# Patient Record
Sex: MALE | Race: WHITE | NOT HISPANIC OR LATINO | Employment: OTHER | URBAN - METROPOLITAN AREA
[De-identification: names, ages, dates, MRNs, and addresses within clinical notes are randomized per-mention and may not be internally consistent; named-entity substitution may affect disease eponyms.]

---

## 2023-03-02 LAB — PROSTATE SPECIFIC AG (NG/ML) IN SER/PLAS: 7.12 NG/ML (ref 0–4)

## 2023-06-04 DIAGNOSIS — I10 BENIGN ESSENTIAL HYPERTENSION: ICD-10-CM

## 2023-06-05 PROBLEM — I10 BENIGN ESSENTIAL HYPERTENSION: Status: ACTIVE | Noted: 2023-06-05

## 2023-06-05 RX ORDER — LISINOPRIL AND HYDROCHLOROTHIAZIDE 12.5; 2 MG/1; MG/1
TABLET ORAL
Qty: 90 TABLET | Refills: 3 | Status: SHIPPED | OUTPATIENT
Start: 2023-06-05 | End: 2024-02-14

## 2023-07-18 ENCOUNTER — LAB (OUTPATIENT)
Dept: LAB | Facility: LAB | Age: 68
End: 2023-07-18
Payer: MEDICARE

## 2023-07-18 ENCOUNTER — OFFICE VISIT (OUTPATIENT)
Dept: PRIMARY CARE | Facility: CLINIC | Age: 68
End: 2023-07-18
Payer: MEDICARE

## 2023-07-18 VITALS
SYSTOLIC BLOOD PRESSURE: 142 MMHG | HEIGHT: 71 IN | DIASTOLIC BLOOD PRESSURE: 72 MMHG | HEART RATE: 67 BPM | BODY MASS INDEX: 30.41 KG/M2 | RESPIRATION RATE: 16 BRPM | WEIGHT: 217.2 LBS | TEMPERATURE: 97.5 F | OXYGEN SATURATION: 98 %

## 2023-07-18 DIAGNOSIS — N42.31 HIGH GRADE PROSTATIC INTRAEPITHELIAL NEOPLASIA: ICD-10-CM

## 2023-07-18 DIAGNOSIS — R97.20 ELEVATED PSA: ICD-10-CM

## 2023-07-18 DIAGNOSIS — I10 BENIGN ESSENTIAL HYPERTENSION: ICD-10-CM

## 2023-07-18 DIAGNOSIS — I10 BENIGN ESSENTIAL HYPERTENSION: Primary | ICD-10-CM

## 2023-07-18 DIAGNOSIS — Z00.00 MEDICARE ANNUAL WELLNESS VISIT, SUBSEQUENT: ICD-10-CM

## 2023-07-18 PROBLEM — K06.9 GINGIVAL AND PERIODONTAL DISEASE: Status: ACTIVE | Noted: 2023-07-18

## 2023-07-18 PROBLEM — D12.6 TUBULAR ADENOMA OF COLON: Status: ACTIVE | Noted: 2023-07-18

## 2023-07-18 PROBLEM — L85.3 DRY SKIN: Status: ACTIVE | Noted: 2023-07-18

## 2023-07-18 PROBLEM — K05.6 GINGIVAL AND PERIODONTAL DISEASE: Status: ACTIVE | Noted: 2023-07-18

## 2023-07-18 PROBLEM — R06.83 SNORING: Status: ACTIVE | Noted: 2023-07-18

## 2023-07-18 PROBLEM — R22.0 FACIAL SWELLING: Status: ACTIVE | Noted: 2023-07-18

## 2023-07-18 PROBLEM — N52.9 ERECTILE DYSFUNCTION: Status: ACTIVE | Noted: 2023-07-18

## 2023-07-18 LAB
BASOPHILS (10*3/UL) IN BLOOD BY AUTOMATED COUNT: 0.02 X10E9/L (ref 0–0.1)
BASOPHILS/100 LEUKOCYTES IN BLOOD BY AUTOMATED COUNT: 0.3 % (ref 0–2)
EOSINOPHILS (10*3/UL) IN BLOOD BY AUTOMATED COUNT: 0.06 X10E9/L (ref 0–0.7)
EOSINOPHILS/100 LEUKOCYTES IN BLOOD BY AUTOMATED COUNT: 0.9 % (ref 0–6)
ERYTHROCYTE DISTRIBUTION WIDTH (RATIO) BY AUTOMATED COUNT: 13.2 % (ref 11.5–14.5)
ERYTHROCYTE MEAN CORPUSCULAR HEMOGLOBIN CONCENTRATION (G/DL) BY AUTOMATED: 33.3 G/DL (ref 32–36)
ERYTHROCYTE MEAN CORPUSCULAR VOLUME (FL) BY AUTOMATED COUNT: 97 FL (ref 80–100)
ERYTHROCYTES (10*6/UL) IN BLOOD BY AUTOMATED COUNT: 4.5 X10E12/L (ref 4.5–5.9)
HEMATOCRIT (%) IN BLOOD BY AUTOMATED COUNT: 43.5 % (ref 41–52)
HEMOGLOBIN (G/DL) IN BLOOD: 14.5 G/DL (ref 13.5–17.5)
IMMATURE GRANULOCYTES/100 LEUKOCYTES IN BLOOD BY AUTOMATED COUNT: 0.3 % (ref 0–0.9)
LEUKOCYTES (10*3/UL) IN BLOOD BY AUTOMATED COUNT: 6.3 X10E9/L (ref 4.4–11.3)
LYMPHOCYTES (10*3/UL) IN BLOOD BY AUTOMATED COUNT: 1.66 X10E9/L (ref 1.2–4.8)
LYMPHOCYTES/100 LEUKOCYTES IN BLOOD BY AUTOMATED COUNT: 26.2 % (ref 13–44)
MONOCYTES (10*3/UL) IN BLOOD BY AUTOMATED COUNT: 0.5 X10E9/L (ref 0.1–1)
MONOCYTES/100 LEUKOCYTES IN BLOOD BY AUTOMATED COUNT: 7.9 % (ref 2–10)
NEUTROPHILS (10*3/UL) IN BLOOD BY AUTOMATED COUNT: 4.07 X10E9/L (ref 1.2–7.7)
NEUTROPHILS/100 LEUKOCYTES IN BLOOD BY AUTOMATED COUNT: 64.4 % (ref 40–80)
NRBC (PER 100 WBCS) BY AUTOMATED COUNT: 0 /100 WBC (ref 0–0)
PLATELETS (10*3/UL) IN BLOOD AUTOMATED COUNT: 210 X10E9/L (ref 150–450)

## 2023-07-18 PROCEDURE — G0444 DEPRESSION SCREEN ANNUAL: HCPCS | Performed by: FAMILY MEDICINE

## 2023-07-18 PROCEDURE — 1036F TOBACCO NON-USER: CPT | Performed by: FAMILY MEDICINE

## 2023-07-18 PROCEDURE — 36415 COLL VENOUS BLD VENIPUNCTURE: CPT

## 2023-07-18 PROCEDURE — 84153 ASSAY OF PSA TOTAL: CPT

## 2023-07-18 PROCEDURE — 3008F BODY MASS INDEX DOCD: CPT | Performed by: FAMILY MEDICINE

## 2023-07-18 PROCEDURE — 85025 COMPLETE CBC W/AUTO DIFF WBC: CPT

## 2023-07-18 PROCEDURE — G0439 PPPS, SUBSEQ VISIT: HCPCS | Performed by: FAMILY MEDICINE

## 2023-07-18 PROCEDURE — 80053 COMPREHEN METABOLIC PANEL: CPT

## 2023-07-18 PROCEDURE — 1170F FXNL STATUS ASSESSED: CPT | Performed by: FAMILY MEDICINE

## 2023-07-18 PROCEDURE — 3077F SYST BP >= 140 MM HG: CPT | Performed by: FAMILY MEDICINE

## 2023-07-18 PROCEDURE — 84443 ASSAY THYROID STIM HORMONE: CPT

## 2023-07-18 PROCEDURE — 1126F AMNT PAIN NOTED NONE PRSNT: CPT | Performed by: FAMILY MEDICINE

## 2023-07-18 PROCEDURE — 3078F DIAST BP <80 MM HG: CPT | Performed by: FAMILY MEDICINE

## 2023-07-18 PROCEDURE — 1159F MED LIST DOCD IN RCRD: CPT | Performed by: FAMILY MEDICINE

## 2023-07-18 PROCEDURE — 1160F RVW MEDS BY RX/DR IN RCRD: CPT | Performed by: FAMILY MEDICINE

## 2023-07-18 PROCEDURE — 81003 URINALYSIS AUTO W/O SCOPE: CPT

## 2023-07-18 PROCEDURE — 80061 LIPID PANEL: CPT

## 2023-07-18 RX ORDER — TADALAFIL 20 MG/1
TABLET ORAL
COMMUNITY
End: 2024-01-18 | Stop reason: SDUPTHER

## 2023-07-18 ASSESSMENT — ACTIVITIES OF DAILY LIVING (ADL)
GROCERY_SHOPPING: INDEPENDENT
DRESSING: INDEPENDENT
TAKING_MEDICATION: INDEPENDENT
MANAGING_FINANCES: INDEPENDENT
BATHING: INDEPENDENT
DOING_HOUSEWORK: INDEPENDENT

## 2023-07-18 ASSESSMENT — PATIENT HEALTH QUESTIONNAIRE - PHQ9
1. LITTLE INTEREST OR PLEASURE IN DOING THINGS: NOT AT ALL
SUM OF ALL RESPONSES TO PHQ9 QUESTIONS 1 AND 2: 0
2. FEELING DOWN, DEPRESSED OR HOPELESS: NOT AT ALL

## 2023-07-18 ASSESSMENT — PAIN SCALES - GENERAL: PAINLEVEL: 0-NO PAIN

## 2023-07-18 NOTE — PROGRESS NOTES
"No concerns to address    Subjective   Reason for Visit: Josemanuel Gorman is an 67 y.o. male here for a Medicare Wellness visit.          Reviewed all medications by prescribing practitioner or clinical pharmacist (such as prescriptions, OTCs, herbal therapies and supplements) and documented in the medical record.    HPI    Patient Care Team:  Dima Whiting MD as PCP - General (Family Medicine)  Dima Whitnig MD as PCP - [employer] PCP     Review of Systems    Objective   Vitals:  /72 (BP Location: Right arm, Patient Position: Sitting, BP Cuff Size: Adult)   Pulse 67   Temp 36.4 °C (97.5 °F) (Temporal)   Resp 16   Ht 1.791 m (5' 10.5\")   Wt 98.5 kg (217 lb 3.2 oz)   SpO2 98%   BMI 30.72 kg/m²       Physical Exam    Assessment/Plan   Problem List Items Addressed This Visit    None         "

## 2023-07-18 NOTE — PROGRESS NOTES
"Subjective   Patient ID: Josemanuel Gorman is a 67 y.o. male who presents for Medicare Annual Wellness Visit Subsequent.    [facility]   Josemanuel was seen today for a routine follow-up of his hypertension, and an annual Medicare wellness review.  He overall feels well, has no new concerns.  His brother, who is younger by 16 months, had a massive heart attack not long ago.  Josemanuel states that he had uncontrolled hypertension for years, essentially never sought medical care.  Josemanuel has never smoked, had high blood sugars, and has no chest pain, dyspnea, lower extremity edema.    He is fasting for blood today.  Urology has followed his PSA levels closely.  He had multiple biopsies March 29 of this year, virtually all showed benign tissue with inflammation, several with high-grade neoplasia.  He has a follow-up soon in early August.    He just had his first Shingrix yesterday, colonoscopy is up-to-date, due next year.  He has no indication for AAA ultrasound.  Pneumonia vaccines are up-to-date.  Review of Systems  The full, 10+ multi-organ review of systems, is within normal limits with the exception of what is noted above in HPI.  Objective   /72 (BP Location: Right arm, Patient Position: Sitting, BP Cuff Size: Adult)   Pulse 67   Temp 36.4 °C (97.5 °F) (Temporal)   Resp 16   Ht 1.791 m (5' 10.5\")   Wt 98.5 kg (217 lb 3.2 oz)   SpO2 98%   BMI 30.72 kg/m²     Physical Exam  Cardiac exam reveals a regular rate and rhythm, lungs are clear, no lower extremity edema present  Constitutional/General appearance: alert, oriented, well-appearing, in no distress  Head and face exam is normal  No scleral icterus or conjunctival erythema present  Hearing is definitely reduced, hearing aids in place  Respiratory effort is normal, no dyspnea noted  Cortical function is normal  Mood, affect, are pleasant, appropriate, and interactive.  Insight is normal    Assessment/Plan     Hypertension, slightly elevated at 142 systolic.  He will " continue to work on weight loss efforts, has lost 5 pounds recently.  I have ordered a CT coronary artery scan, if sufficient plaque present, would recommend statin, aspirin, tight blood pressure control.    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    All Medicare gaps closed    Follow-up in 6 months    **Portions of this medical record have been created using voice recognition software and may have minor errors which are inherent in voice recognition systems. It has not been fully edited for typographical or grammatical errors**

## 2023-07-19 LAB
ALANINE AMINOTRANSFERASE (SGPT) (U/L) IN SER/PLAS: 19 U/L (ref 10–52)
ALBUMIN (G/DL) IN SER/PLAS: 4.7 G/DL (ref 3.4–5)
ALKALINE PHOSPHATASE (U/L) IN SER/PLAS: 96 U/L (ref 33–136)
ANION GAP IN SER/PLAS: 15 MMOL/L (ref 10–20)
APPEARANCE, URINE: CLEAR
ASPARTATE AMINOTRANSFERASE (SGOT) (U/L) IN SER/PLAS: 15 U/L (ref 9–39)
BILIRUBIN TOTAL (MG/DL) IN SER/PLAS: 1 MG/DL (ref 0–1.2)
BILIRUBIN, URINE: NEGATIVE
BLOOD, URINE: NEGATIVE
CALCIUM (MG/DL) IN SER/PLAS: 9.2 MG/DL (ref 8.6–10.6)
CARBON DIOXIDE, TOTAL (MMOL/L) IN SER/PLAS: 26 MMOL/L (ref 21–32)
CHLORIDE (MMOL/L) IN SER/PLAS: 102 MMOL/L (ref 98–107)
CHOLESTEROL (MG/DL) IN SER/PLAS: 181 MG/DL (ref 0–199)
CHOLESTEROL IN HDL (MG/DL) IN SER/PLAS: 39.6 MG/DL
CHOLESTEROL/HDL RATIO: 4.6
COLOR, URINE: YELLOW
CREATININE (MG/DL) IN SER/PLAS: 0.66 MG/DL (ref 0.5–1.3)
GFR MALE: >90 ML/MIN/1.73M2
GLUCOSE (MG/DL) IN SER/PLAS: 86 MG/DL (ref 74–99)
GLUCOSE, URINE: NEGATIVE MG/DL
KETONES, URINE: NEGATIVE MG/DL
LDL: 124 MG/DL (ref 0–99)
LEUKOCYTE ESTERASE, URINE: NEGATIVE
NITRITE, URINE: NEGATIVE
PH, URINE: 5 (ref 5–8)
POTASSIUM (MMOL/L) IN SER/PLAS: 4.3 MMOL/L (ref 3.5–5.3)
PROSTATE SPECIFIC AG (NG/ML) IN SER/PLAS: 6.52 NG/ML (ref 0–4)
PROTEIN TOTAL: 7.1 G/DL (ref 6.4–8.2)
PROTEIN, URINE: NEGATIVE MG/DL
SODIUM (MMOL/L) IN SER/PLAS: 139 MMOL/L (ref 136–145)
SPECIFIC GRAVITY, URINE: 1.02 (ref 1–1.03)
THYROTROPIN (MIU/L) IN SER/PLAS BY DETECTION LIMIT <= 0.05 MIU/L: 1.29 MIU/L (ref 0.44–3.98)
TRIGLYCERIDE (MG/DL) IN SER/PLAS: 85 MG/DL (ref 0–149)
UREA NITROGEN (MG/DL) IN SER/PLAS: 19 MG/DL (ref 6–23)
UROBILINOGEN, URINE: <2 MG/DL (ref 0–1.9)
VLDL: 17 MG/DL (ref 0–40)

## 2023-07-19 NOTE — RESULT ENCOUNTER NOTE
PSA is slightly lower than last check, was 7.12, now 6.5 to.  Continue follow-up with urology.  Cholesterol, sugar, all other labs are reassuring.

## 2023-10-30 ENCOUNTER — LAB (OUTPATIENT)
Dept: LAB | Facility: LAB | Age: 68
End: 2023-10-30
Payer: MEDICARE

## 2023-10-30 DIAGNOSIS — R97.20 ELEVATED PROSTATE SPECIFIC ANTIGEN (PSA): Primary | ICD-10-CM

## 2023-10-30 PROCEDURE — 36415 COLL VENOUS BLD VENIPUNCTURE: CPT

## 2023-10-30 PROCEDURE — 84153 ASSAY OF PSA TOTAL: CPT

## 2023-10-31 LAB — PSA SERPL-MCNC: 7.14 NG/ML

## 2023-11-06 ENCOUNTER — OFFICE VISIT (OUTPATIENT)
Dept: UROLOGY | Facility: HOSPITAL | Age: 68
End: 2023-11-06
Payer: MEDICARE

## 2023-11-06 DIAGNOSIS — R97.20 ELEVATED PSA: ICD-10-CM

## 2023-11-06 PROCEDURE — 3078F DIAST BP <80 MM HG: CPT | Performed by: STUDENT IN AN ORGANIZED HEALTH CARE EDUCATION/TRAINING PROGRAM

## 2023-11-06 PROCEDURE — 3008F BODY MASS INDEX DOCD: CPT | Performed by: STUDENT IN AN ORGANIZED HEALTH CARE EDUCATION/TRAINING PROGRAM

## 2023-11-06 PROCEDURE — 1126F AMNT PAIN NOTED NONE PRSNT: CPT | Performed by: STUDENT IN AN ORGANIZED HEALTH CARE EDUCATION/TRAINING PROGRAM

## 2023-11-06 PROCEDURE — 99213 OFFICE O/P EST LOW 20 MIN: CPT | Performed by: STUDENT IN AN ORGANIZED HEALTH CARE EDUCATION/TRAINING PROGRAM

## 2023-11-06 PROCEDURE — 3077F SYST BP >= 140 MM HG: CPT | Performed by: STUDENT IN AN ORGANIZED HEALTH CARE EDUCATION/TRAINING PROGRAM

## 2023-11-06 PROCEDURE — 1160F RVW MEDS BY RX/DR IN RCRD: CPT | Performed by: STUDENT IN AN ORGANIZED HEALTH CARE EDUCATION/TRAINING PROGRAM

## 2023-11-06 PROCEDURE — 1036F TOBACCO NON-USER: CPT | Performed by: STUDENT IN AN ORGANIZED HEALTH CARE EDUCATION/TRAINING PROGRAM

## 2023-11-06 PROCEDURE — 1159F MED LIST DOCD IN RCRD: CPT | Performed by: STUDENT IN AN ORGANIZED HEALTH CARE EDUCATION/TRAINING PROGRAM

## 2023-11-06 NOTE — PROGRESS NOTES
UROLOGIC FOLLOW-UP VISIT     PROBLEM LIST:  1. Elevated PSA  PSA           HISTORY OF PRESENT ILLNESS:   67-year-old male with a history of elevated PSA who subsequently underwent a prostate MRI which showed a PI-RADS 5 lesion. Patient previously underwent a prostate MRI in 2021 which was negative for any clinically significant lesions. Patient underwent fusion biopsy on 3/22/23, and presents today for PSA follow up. His most recent PSA on 10/30/2023 was 7.14. Denies any hematuria, f/c/n/v.     PAST MEDICAL HISTORY:  Past Medical History:   Diagnosis Date    Encounter for general adult medical examination without abnormal findings 06/24/2021    Encounter for Medicare annual wellness exam    Encounter for general adult medical examination without abnormal findings 04/10/2019    Welcome to Medicare preventive visit    Encounter for immunization     Immunization due    Encounter for screening for malignant neoplasm of colon 04/03/2018    Colon cancer screening    GERD (gastroesophageal reflux disease) 6/2018    HL (hearing loss) 6/2008    Hypertension 6/2006    Personal history of other drug therapy 04/10/2019    History of pneumococcal vaccination    Personal history of other drug therapy 06/24/2021    History of drug therapy       PAST SURGICAL HISTORY:  Past Surgical History:   Procedure Laterality Date    COLONOSCOPY W/ POLYPECTOMY  09/22/2014    Complete Colonoscopy For Polyp Removal    OTHER SURGICAL HISTORY  03/26/2014    Lipectomy Of Arm        ALLERGIES:   No Known Allergies     MEDICATIONS:     Current Outpatient Medications:     lisinopriL-hydrochlorothiazide 20-12.5 mg tablet, TAKE 1 TABLET BY MOUTH DAILY, Disp: 90 tablet, Rfl: 3    tadalafil 20 mg tablet, TAKE ONE TABLET BY MOUTH DAILY 1 HOUR BEFORE NEEDED, Disp: , Rfl:       SOCIAL HISTORY:  Patient  reports that he has never smoked. He has never used smokeless tobacco. He reports current alcohol use of about 2.0 standard drinks of alcohol per week. He  reports that he does not use drugs.   Social History     Socioeconomic History    Marital status: Single     Spouse name: Not on file    Number of children: Not on file    Years of education: Not on file    Highest education level: Not on file   Occupational History    Not on file   Tobacco Use    Smoking status: Never    Smokeless tobacco: Never   Vaping Use    Vaping Use: Never used   Substance and Sexual Activity    Alcohol use: Yes     Alcohol/week: 2.0 standard drinks of alcohol     Types: 2 Cans of beer per week    Drug use: Never    Sexual activity: Yes     Partners: Female   Other Topics Concern    Not on file   Social History Narrative    Not on file     Social Determinants of Health     Financial Resource Strain: Not on file   Food Insecurity: Not on file   Transportation Needs: Not on file   Physical Activity: Not on file   Stress: Not on file   Social Connections: Not on file   Intimate Partner Violence: Not on file   Housing Stability: Not on file       FAMILY HISTORY:  Family History   Problem Relation Name Age of Onset    Hypertension Father      Multiple sclerosis Sister      Hypertension Brother Rick        REVIEW OF SYSTEMS:  Constitutional: Negative for fever and chills. Denies anorexia, weight loss.  Eyes: Negative for visual disturbance.   Respiratory: Negative for shortness of breath.    Cardiovascular: Negative for chest pain.   Gastrointestinal: Negative for nausea and vomiting.   Genitourinary: See interval history above.  Skin: Negative for rash.   Neurological: Negative for dizziness and numbness.   Psychiatric/Behavioral: Negative for confusion and decreased concentration.     PHYSICAL EXAM:  There were no vitals taken for this visit.  Constitutional: Patient appears well-developed and well-nourished. No distress.    Head: Normocephalic and atraumatic.    Neck: Normal range of motion.    Cardiovascular: Normal rate.    Pulmonary/Chest: Effort normal. No respiratory distress.    Abdominal: soft NTND  Musculoskeletal: Normal range of motion.    Neurological: Alert and oriented to person, place, and time.  Psychiatric: Normal mood and affect. Behavior is normal. Thought content normal.      Lab Results   Component Value Date    BUN 19 07/18/2023    CREATININE 0.66 07/18/2023     07/18/2023    K 4.3 07/18/2023     07/18/2023    CO2 26 07/18/2023    CALCIUM 9.2 07/18/2023      Lab Results   Component Value Date    WBC 6.3 07/18/2023    RBC 4.50 07/18/2023    HGB 14.5 07/18/2023    HCT 43.5 07/18/2023    MCV 97 07/18/2023    MCHC 33.3 07/18/2023    RDW 13.2 07/18/2023     07/18/2023        Lab Results   Component Value Date    PSA 7.14 (H) 10/30/2023    PSA 6.52 (H) 07/18/2023    PSA 7.12 (H) 03/01/2023    PSA 7.87 (H) 01/05/2023    PSA 8.0 (H) 10/03/2022    PSA 7.6 (H) 06/21/2022    PSA 8.5 (H) 02/15/2022    PSA 8.8 (H) 07/02/2021           Assessment:      1. Elevated PSA  PSA           Plan:    Reviewed and interpreted PSA results with patient    Counseled patient on needing to keep a close eye on his PSA numbers    Rtc in 3 mo with PSA     28 minutes total spent on patient's care today; >50% time spent on counseling/coordination of care

## 2024-01-17 ASSESSMENT — ENCOUNTER SYMPTOMS
HYPERTENSION: 1
PND: 0
NECK PAIN: 0
BLURRED VISION: 0
ORTHOPNEA: 0
SWEATS: 0
SHORTNESS OF BREATH: 0
PALPITATIONS: 0
HEADACHES: 0

## 2024-01-18 ENCOUNTER — OFFICE VISIT (OUTPATIENT)
Dept: PRIMARY CARE | Facility: CLINIC | Age: 69
End: 2024-01-18
Payer: MEDICARE

## 2024-01-18 VITALS
WEIGHT: 217.4 LBS | HEART RATE: 54 BPM | RESPIRATION RATE: 14 BRPM | OXYGEN SATURATION: 99 % | TEMPERATURE: 96 F | DIASTOLIC BLOOD PRESSURE: 67 MMHG | SYSTOLIC BLOOD PRESSURE: 124 MMHG | BODY MASS INDEX: 30.32 KG/M2

## 2024-01-18 DIAGNOSIS — N52.9 ERECTILE DYSFUNCTION, UNSPECIFIED ERECTILE DYSFUNCTION TYPE: ICD-10-CM

## 2024-01-18 DIAGNOSIS — I10 BENIGN ESSENTIAL HYPERTENSION: Primary | ICD-10-CM

## 2024-01-18 DIAGNOSIS — N42.31 HIGH GRADE PROSTATIC INTRAEPITHELIAL NEOPLASIA: ICD-10-CM

## 2024-01-18 PROCEDURE — 1126F AMNT PAIN NOTED NONE PRSNT: CPT | Performed by: FAMILY MEDICINE

## 2024-01-18 PROCEDURE — 3074F SYST BP LT 130 MM HG: CPT | Performed by: FAMILY MEDICINE

## 2024-01-18 PROCEDURE — 1036F TOBACCO NON-USER: CPT | Performed by: FAMILY MEDICINE

## 2024-01-18 PROCEDURE — 1159F MED LIST DOCD IN RCRD: CPT | Performed by: FAMILY MEDICINE

## 2024-01-18 PROCEDURE — 3078F DIAST BP <80 MM HG: CPT | Performed by: FAMILY MEDICINE

## 2024-01-18 PROCEDURE — 3008F BODY MASS INDEX DOCD: CPT | Performed by: FAMILY MEDICINE

## 2024-01-18 PROCEDURE — 99214 OFFICE O/P EST MOD 30 MIN: CPT | Performed by: FAMILY MEDICINE

## 2024-01-18 PROCEDURE — 1160F RVW MEDS BY RX/DR IN RCRD: CPT | Performed by: FAMILY MEDICINE

## 2024-01-18 RX ORDER — TADALAFIL 20 MG/1
TABLET ORAL
Qty: 30 TABLET | Refills: 1 | Status: SHIPPED | OUTPATIENT
Start: 2024-01-18

## 2024-01-18 NOTE — PROGRESS NOTES
Subjective   Patient ID: Josemanuel Gorman is a 68 y.o. male who presents for Follow-up (Pt presents for 6 month follow up HTN- pt states no new concerns at this time. ).    HPI   Josemanuel was seen today for a 6-month follow-up of his hypertension, for which he takes lisinopril/hydrochlorothiazide.  He tolerates it well, has an occasional cough which is not bothersome.  His only other medication is tadalafil, which he takes as needed, 1/2 tablet.  He does request a refill.  There are no complaints of chest pain, shortness of breath, lower extremity edema, or exertional concerns  Fasting labs and urine studies were checked in July.  PSA is elevated, he sees urology in another month or so.  He is up-to-date with all respiratory vaccines.  Review of Systems  The full, 10+ multi-organ review of systems, is within normal limits with the exception of what is noted above in HPI.  Objective   /67 (BP Location: Left arm, Patient Position: Sitting, BP Cuff Size: Small adult)   Pulse 54   Temp 35.6 °C (96 °F) (Temporal)   Resp 14   Wt 98.6 kg (217 lb 6.4 oz)   SpO2 99%   BMI 30.32 kg/m²     Physical Exam  Constitutional/General appearance: alert, oriented, well-appearing, in no distress  Head and face exam is normal  No scleral icterus or conjunctival erythema present  Hearing is significantly reduced, aids in place  Respiratory effort is normal, no dyspnea noted  Cortical function is normal  Mood, affect, are pleasant, appropriate, and interactive.  Insight is normal  Cardiac exam reveals a regular rate and rhythm, lungs are clear, no lower extremity edema present.  Assessment/Plan     Hypertension--- since today's blood pressures are at goal, I have recommended continuing the current treatment regimen, including medication as noted above, as well as a low salt, low-fat, high-fiber diet.  Exercise is to be continued as able and tolerated.  We will continue to follow the high blood pressure on an every six-month basis,  and address additional needs should they arise.    Erectile dysfunction, tadalafil refilled as noted.  Vaccines up-to-date    Continue healthy lifestyle efforts  Follow-up in 6 months for annual Medicare wellness.

## 2024-01-29 ENCOUNTER — LAB (OUTPATIENT)
Dept: LAB | Facility: LAB | Age: 69
End: 2024-01-29
Payer: MEDICARE

## 2024-01-29 DIAGNOSIS — R97.20 ELEVATED PSA: ICD-10-CM

## 2024-01-29 PROCEDURE — 84153 ASSAY OF PSA TOTAL: CPT

## 2024-01-29 PROCEDURE — 36415 COLL VENOUS BLD VENIPUNCTURE: CPT

## 2024-01-30 LAB — PSA SERPL-MCNC: 6.42 NG/ML

## 2024-02-02 NOTE — PROGRESS NOTES
UROLOGIC FOLLOW-UP VISIT     PROBLEM LIST:  1. Elevated PSA  PSA           HISTORY OF PRESENT ILLNESS:     67-year-old male with a history of elevated PSA who subsequently underwent a prostate MRI which showed a PI-RADS 5 lesion. Patient previously underwent a prostate MRI in 2021 which was negative for any clinically significant lesions. Patient underwent fusion biopsy on 3/22/23, which was negative, and presents today for PSA follow up. PSA 10/30/2023 7.14, 1/29/2024 6.42 . Denies any hematuria, f/c/n/v.      PAST MEDICAL HISTORY:  Past Medical History:   Diagnosis Date    Encounter for general adult medical examination without abnormal findings 06/24/2021    Encounter for Medicare annual wellness exam    Encounter for general adult medical examination without abnormal findings 04/10/2019    Welcome to Medicare preventive visit    Encounter for immunization     Immunization due    Encounter for screening for malignant neoplasm of colon 04/03/2018    Colon cancer screening    GERD (gastroesophageal reflux disease) 6/2018    HL (hearing loss) 6/2008    Hypertension 6/2006    Personal history of other drug therapy 04/10/2019    History of pneumococcal vaccination    Personal history of other drug therapy 06/24/2021    History of drug therapy       PAST SURGICAL HISTORY:  Past Surgical History:   Procedure Laterality Date    COLONOSCOPY W/ POLYPECTOMY  09/22/2014    Complete Colonoscopy For Polyp Removal    OTHER SURGICAL HISTORY  03/26/2014    Lipectomy Of Arm        ALLERGIES:   No Known Allergies     MEDICATIONS:     Current Outpatient Medications:     lisinopriL-hydrochlorothiazide 20-12.5 mg tablet, TAKE 1 TABLET BY MOUTH DAILY, Disp: 90 tablet, Rfl: 3    tadalafil 20 mg tablet, TAKE ONE TABLET BY MOUTH DAILY 1 HOUR BEFORE NEEDED.  GoodRx please, Disp: 30 tablet, Rfl: 1      SOCIAL HISTORY:  Patient  reports that he has never smoked. He has never used smokeless tobacco. He reports current alcohol use of about  2.0 standard drinks of alcohol per week. He reports that he does not use drugs.   Social History     Socioeconomic History    Marital status: Single     Spouse name: Not on file    Number of children: Not on file    Years of education: Not on file    Highest education level: Not on file   Occupational History    Not on file   Tobacco Use    Smoking status: Never    Smokeless tobacco: Never   Vaping Use    Vaping Use: Never used   Substance and Sexual Activity    Alcohol use: Yes     Alcohol/week: 2.0 standard drinks of alcohol     Types: 2 Cans of beer per week    Drug use: Never    Sexual activity: Yes     Partners: Female     Birth control/protection: None   Other Topics Concern    Not on file   Social History Narrative    Not on file     Social Determinants of Health     Financial Resource Strain: Not on file   Food Insecurity: Not on file   Transportation Needs: Not on file   Physical Activity: Not on file   Stress: Not on file   Social Connections: Not on file   Intimate Partner Violence: Not on file   Housing Stability: Not on file       FAMILY HISTORY:  Family History   Problem Relation Name Age of Onset    Hypertension Father JR     Multiple sclerosis Sister      Hypertension Brother Rick        REVIEW OF SYSTEMS:  Constitutional: Negative for fever and chills. Denies anorexia, weight loss.  Eyes: Negative for visual disturbance.   Respiratory: Negative for shortness of breath.    Cardiovascular: Negative for chest pain.   Gastrointestinal: Negative for nausea and vomiting.   Genitourinary: See interval history above.  Skin: Negative for rash.   Neurological: Negative for dizziness and numbness.   Psychiatric/Behavioral: Negative for confusion and decreased concentration.     PHYSICAL EXAM:  There were no vitals taken for this visit.  Constitutional: Patient appears well-developed and well-nourished. No distress.    Head: Normocephalic and atraumatic.    Neck: Normal range of motion.    Cardiovascular:  Normal rate.    Pulmonary/Chest: Effort normal. No respiratory distress.   Abdominal: soft NTND  Musculoskeletal: Normal range of motion.    Neurological: Alert and oriented to person, place, and time.  Psychiatric: Normal mood and affect. Behavior is normal. Thought content normal.      \LABORATORY REVIEW:     Lab Results   Component Value Date    BUN 19 07/18/2023    CREATININE 0.66 07/18/2023     07/18/2023    K 4.3 07/18/2023     07/18/2023    CO2 26 07/18/2023    CALCIUM 9.2 07/18/2023      Lab Results   Component Value Date    WBC 6.3 07/18/2023    RBC 4.50 07/18/2023    HGB 14.5 07/18/2023    HCT 43.5 07/18/2023    MCV 97 07/18/2023    MCHC 33.3 07/18/2023    RDW 13.2 07/18/2023     07/18/2023        Lab Results   Component Value Date    PSA 6.42 (H) 01/29/2024    PSA 7.14 (H) 10/30/2023    PSA 6.52 (H) 07/18/2023    PSA 7.12 (H) 03/01/2023    PSA 7.87 (H) 01/05/2023    PSA 8.0 (H) 10/03/2022    PSA 7.6 (H) 06/21/2022    PSA 8.5 (H) 02/15/2022    PSA 8.8 (H) 07/02/2021        Assessment:      1. Elevated PSA  PSA          Plan:    Reviewed and interpreted patient's PSA trend with him today    Counseled patient that I would like to continue obtaining his PSA q3mo    Will likely repeat MRI after next appt.   RTC in 3 mo    24 minutes total spent on patient's care today; >50% time spent on counseling/coordination of care

## 2024-02-05 ENCOUNTER — OFFICE VISIT (OUTPATIENT)
Dept: UROLOGY | Facility: HOSPITAL | Age: 69
End: 2024-02-05
Payer: MEDICARE

## 2024-02-05 DIAGNOSIS — R97.20 ELEVATED PSA: Primary | ICD-10-CM

## 2024-02-05 PROCEDURE — 3008F BODY MASS INDEX DOCD: CPT | Performed by: STUDENT IN AN ORGANIZED HEALTH CARE EDUCATION/TRAINING PROGRAM

## 2024-02-05 PROCEDURE — 1159F MED LIST DOCD IN RCRD: CPT | Performed by: STUDENT IN AN ORGANIZED HEALTH CARE EDUCATION/TRAINING PROGRAM

## 2024-02-05 PROCEDURE — 99213 OFFICE O/P EST LOW 20 MIN: CPT | Performed by: STUDENT IN AN ORGANIZED HEALTH CARE EDUCATION/TRAINING PROGRAM

## 2024-02-05 PROCEDURE — 1126F AMNT PAIN NOTED NONE PRSNT: CPT | Performed by: STUDENT IN AN ORGANIZED HEALTH CARE EDUCATION/TRAINING PROGRAM

## 2024-02-05 PROCEDURE — 1036F TOBACCO NON-USER: CPT | Performed by: STUDENT IN AN ORGANIZED HEALTH CARE EDUCATION/TRAINING PROGRAM

## 2024-02-13 DIAGNOSIS — I10 BENIGN ESSENTIAL HYPERTENSION: ICD-10-CM

## 2024-02-14 RX ORDER — LISINOPRIL AND HYDROCHLOROTHIAZIDE 12.5; 2 MG/1; MG/1
TABLET ORAL
Qty: 100 TABLET | Refills: 3 | Status: SHIPPED | OUTPATIENT
Start: 2024-02-14

## 2024-04-01 ENCOUNTER — HOSPITAL ENCOUNTER (OUTPATIENT)
Dept: RADIOLOGY | Facility: CLINIC | Age: 69
Discharge: HOME | End: 2024-04-01
Payer: MEDICARE

## 2024-04-01 DIAGNOSIS — I10 BENIGN ESSENTIAL HYPERTENSION: ICD-10-CM

## 2024-04-01 PROCEDURE — 75571 CT HRT W/O DYE W/CA TEST: CPT

## 2024-05-14 ENCOUNTER — LAB (OUTPATIENT)
Dept: LAB | Facility: LAB | Age: 69
End: 2024-05-14
Payer: MEDICARE

## 2024-05-14 DIAGNOSIS — R97.20 ELEVATED PSA: Primary | ICD-10-CM

## 2024-05-15 ENCOUNTER — LAB (OUTPATIENT)
Dept: LAB | Facility: LAB | Age: 69
End: 2024-05-15
Payer: MEDICARE

## 2024-05-15 DIAGNOSIS — R97.20 ELEVATED PSA: ICD-10-CM

## 2024-05-15 PROCEDURE — 36415 COLL VENOUS BLD VENIPUNCTURE: CPT

## 2024-05-15 PROCEDURE — 84153 ASSAY OF PSA TOTAL: CPT

## 2024-05-15 NOTE — PROGRESS NOTES
UROLOGIC FOLLOW-UP VISIT     PROBLEM LIST:  1. Elevated PSA             HISTORY OF PRESENT ILLNESS:   67-year-old male with a history of elevated PSA who subsequently underwent a prostate MRI which showed a PI-RADS 5 lesion. Patient previously underwent a prostate MRI in 2021 which was negative for any clinically significant lesions. Patient underwent fusion biopsy on 3/22/23, which was negative, and presents today for PSA follow up. PSA 10/30/2023 7.14, 1/29/2024 6.42, 5/15/24 8.30. Denies any hematuria, f/c/n/v.      PAST MEDICAL HISTORY:  Past Medical History:   Diagnosis Date    Encounter for general adult medical examination without abnormal findings 06/24/2021    Encounter for Medicare annual wellness exam    Encounter for general adult medical examination without abnormal findings 04/10/2019    Welcome to Medicare preventive visit    Encounter for immunization     Immunization due    Encounter for screening for malignant neoplasm of colon 04/03/2018    Colon cancer screening    GERD (gastroesophageal reflux disease) 6/2018    HL (hearing loss) 6/2008    Hypertension 6/2006    Personal history of other drug therapy 04/10/2019    History of pneumococcal vaccination    Personal history of other drug therapy 06/24/2021    History of drug therapy       PAST SURGICAL HISTORY:  Past Surgical History:   Procedure Laterality Date    COLONOSCOPY W/ POLYPECTOMY  09/22/2014    Complete Colonoscopy For Polyp Removal    OTHER SURGICAL HISTORY  03/26/2014    Lipectomy Of Arm        ALLERGIES:   No Known Allergies     MEDICATIONS:     Current Outpatient Medications:     lisinopriL-hydrochlorothiazide 20-12.5 mg tablet, TAKE 1 TABLET BY MOUTH DAILY, Disp: 100 tablet, Rfl: 3    tadalafil 20 mg tablet, TAKE ONE TABLET BY MOUTH DAILY 1 HOUR BEFORE NEEDED.  GoodRx please, Disp: 30 tablet, Rfl: 1      SOCIAL HISTORY:  Patient  reports that he has never smoked. He has never used smokeless tobacco. He reports current alcohol use  of about 2.0 standard drinks of alcohol per week. He reports that he does not use drugs.   Social History     Socioeconomic History    Marital status: Single     Spouse name: Not on file    Number of children: Not on file    Years of education: Not on file    Highest education level: Not on file   Occupational History    Not on file   Tobacco Use    Smoking status: Never    Smokeless tobacco: Never   Vaping Use    Vaping status: Never Used   Substance and Sexual Activity    Alcohol use: Yes     Alcohol/week: 2.0 standard drinks of alcohol     Types: 2 Cans of beer per week    Drug use: Never    Sexual activity: Yes     Partners: Female     Birth control/protection: None   Other Topics Concern    Not on file   Social History Narrative    Not on file     Social Determinants of Health     Financial Resource Strain: Not on file   Food Insecurity: Not on file   Transportation Needs: Not on file   Physical Activity: Not on file   Stress: Not on file   Social Connections: Not on file   Intimate Partner Violence: Not on file   Housing Stability: Not on file       FAMILY HISTORY:  Family History   Problem Relation Name Age of Onset    Hypertension Father JR     Multiple sclerosis Sister      Hypertension Brother Rick        REVIEW OF SYSTEMS:   Constitutional: Negative for fever and chills. Denies anorexia, weight loss.  Eyes: Negative for visual disturbance.   Respiratory: Negative for shortness of breath.    Cardiovascular: Negative for chest pain.   Gastrointestinal: Negative for nausea and vomiting.   Genitourinary: See interval history above.  Skin: Negative for rash.   Neurological: Negative for dizziness and numbness.   Psychiatric/Behavioral: Negative for confusion and decreased concentration.     PHYSICAL EXAM:  There were no vitals taken for this visit.  Constitutional: Patient appears well-developed and well-nourished. No distress.    Head: Normocephalic and atraumatic.    Neck: Normal range of motion.     Cardiovascular: Normal rate.    Pulmonary/Chest: Effort normal. No respiratory distress.   Abdominal: Soft nontender nondistended  Musculoskeletal: Normal range of motion.    Neurological: Alert and oriented to person, place, and time.  Psychiatric: Normal mood and affect. Behavior is normal. Thought content normal.      LABORATORY REVIEW:     Lab Results   Component Value Date    BUN 19 07/18/2023    CREATININE 0.66 07/18/2023     07/18/2023    K 4.3 07/18/2023     07/18/2023    CO2 26 07/18/2023    CALCIUM 9.2 07/18/2023      Lab Results   Component Value Date    WBC 6.3 07/18/2023    RBC 4.50 07/18/2023    HGB 14.5 07/18/2023    HCT 43.5 07/18/2023    MCV 97 07/18/2023    MCHC 33.3 07/18/2023    RDW 13.2 07/18/2023     07/18/2023        Lab Results   Component Value Date    PSA 6.42 (H) 01/29/2024    PSA 7.14 (H) 10/30/2023    PSA 6.52 (H) 07/18/2023    PSA 7.12 (H) 03/01/2023    PSA 7.87 (H) 01/05/2023    PSA 8.0 (H) 10/03/2022    PSA 7.6 (H) 06/21/2022    PSA 8.5 (H) 02/15/2022    PSA 8.8 (H) 07/02/2021           Assessment:      1. Elevated PSA             Plan:   Reviewed and interpreted patient's PSA trend  Reviewed and interpreted patient's prostate MRI  Discussed with patient that based off his increase in PSA and his prior Prostate MRI I would recommend proceeding with repeat biopsy of the prostate  We discussed the steps that his procedure, patient has understanding of the procedure and the anticipated recovery    31 minutes total spent on patient's care today; >50% time spent on counseling/coordination of care

## 2024-05-16 LAB — PSA SERPL-MCNC: 8.3 NG/ML

## 2024-05-20 ENCOUNTER — OFFICE VISIT (OUTPATIENT)
Dept: UROLOGY | Facility: HOSPITAL | Age: 69
End: 2024-05-20
Payer: MEDICARE

## 2024-05-20 DIAGNOSIS — R97.20 ELEVATED PSA: Primary | ICD-10-CM

## 2024-05-20 PROCEDURE — 99214 OFFICE O/P EST MOD 30 MIN: CPT | Performed by: STUDENT IN AN ORGANIZED HEALTH CARE EDUCATION/TRAINING PROGRAM

## 2024-05-20 PROCEDURE — 1159F MED LIST DOCD IN RCRD: CPT | Performed by: STUDENT IN AN ORGANIZED HEALTH CARE EDUCATION/TRAINING PROGRAM

## 2024-05-22 DIAGNOSIS — R97.20 ELEVATED PSA: Primary | ICD-10-CM

## 2024-05-28 ENCOUNTER — PREP FOR PROCEDURE (OUTPATIENT)
Dept: UROLOGY | Facility: HOSPITAL | Age: 69
End: 2024-05-28
Payer: MEDICARE

## 2024-05-28 DIAGNOSIS — R97.20 ELEVATED PSA MEASUREMENT: ICD-10-CM

## 2024-05-28 RX ORDER — SODIUM CHLORIDE 9 MG/ML
100 INJECTION, SOLUTION INTRAVENOUS CONTINUOUS
Status: CANCELLED | OUTPATIENT
Start: 2024-05-28

## 2024-06-05 ENCOUNTER — HOSPITAL ENCOUNTER (OUTPATIENT)
Dept: RADIOLOGY | Facility: CLINIC | Age: 69
Discharge: HOME | End: 2024-06-05
Payer: MEDICARE

## 2024-06-05 DIAGNOSIS — R97.20 ELEVATED PSA: ICD-10-CM

## 2024-06-05 PROCEDURE — 72197 MRI PELVIS W/O & W/DYE: CPT | Performed by: RADIOLOGY

## 2024-06-05 PROCEDURE — 76498 UNLISTED MR PROCEDURE: CPT | Performed by: RADIOLOGY

## 2024-06-05 PROCEDURE — A9575 INJ GADOTERATE MEGLUMI 0.1ML: HCPCS | Performed by: STUDENT IN AN ORGANIZED HEALTH CARE EDUCATION/TRAINING PROGRAM

## 2024-06-05 PROCEDURE — 2550000001 HC RX 255 CONTRASTS: Performed by: STUDENT IN AN ORGANIZED HEALTH CARE EDUCATION/TRAINING PROGRAM

## 2024-06-05 PROCEDURE — 72197 MRI PELVIS W/O & W/DYE: CPT

## 2024-06-05 RX ORDER — GADOTERATE MEGLUMINE 376.9 MG/ML
0.2 INJECTION INTRAVENOUS
Status: COMPLETED | OUTPATIENT
Start: 2024-06-05 | End: 2024-06-05

## 2024-06-05 RX ADMIN — GADOTERATE MEGLUMINE 20 ML: 376.9 INJECTION INTRAVENOUS at 12:02

## 2024-06-06 ENCOUNTER — ANESTHESIA EVENT (OUTPATIENT)
Dept: OPERATING ROOM | Facility: HOSPITAL | Age: 69
End: 2024-06-06
Payer: MEDICARE

## 2024-06-07 ENCOUNTER — HOSPITAL ENCOUNTER (OUTPATIENT)
Facility: HOSPITAL | Age: 69
Setting detail: OUTPATIENT SURGERY
Discharge: HOME | End: 2024-06-07
Attending: STUDENT IN AN ORGANIZED HEALTH CARE EDUCATION/TRAINING PROGRAM | Admitting: STUDENT IN AN ORGANIZED HEALTH CARE EDUCATION/TRAINING PROGRAM
Payer: MEDICARE

## 2024-06-07 ENCOUNTER — ANESTHESIA (OUTPATIENT)
Dept: OPERATING ROOM | Facility: HOSPITAL | Age: 69
End: 2024-06-07
Payer: MEDICARE

## 2024-06-07 VITALS
DIASTOLIC BLOOD PRESSURE: 69 MMHG | SYSTOLIC BLOOD PRESSURE: 117 MMHG | HEART RATE: 51 BPM | OXYGEN SATURATION: 99 % | BODY MASS INDEX: 29.04 KG/M2 | TEMPERATURE: 97.7 F | HEIGHT: 71 IN | RESPIRATION RATE: 15 BRPM | WEIGHT: 207.45 LBS

## 2024-06-07 DIAGNOSIS — R97.20 ELEVATED PSA MEASUREMENT: ICD-10-CM

## 2024-06-07 PROBLEM — Z78.9 DIFFICULT INTRAVENOUS ACCESS: Status: ACTIVE | Noted: 2024-06-07

## 2024-06-07 PROBLEM — K21.9 GASTROESOPHAGEAL REFLUX DISEASE: Status: ACTIVE | Noted: 2024-06-07

## 2024-06-07 PROCEDURE — 7100000001 HC RECOVERY ROOM TIME - INITIAL BASE CHARGE: Performed by: STUDENT IN AN ORGANIZED HEALTH CARE EDUCATION/TRAINING PROGRAM

## 2024-06-07 PROCEDURE — 55706 BX PRST8 NDL SAT SAMPLING: CPT | Performed by: STUDENT IN AN ORGANIZED HEALTH CARE EDUCATION/TRAINING PROGRAM

## 2024-06-07 PROCEDURE — 7100000002 HC RECOVERY ROOM TIME - EACH INCREMENTAL 1 MINUTE: Performed by: STUDENT IN AN ORGANIZED HEALTH CARE EDUCATION/TRAINING PROGRAM

## 2024-06-07 PROCEDURE — 2500000005 HC RX 250 GENERAL PHARMACY W/O HCPCS: Performed by: ANESTHESIOLOGY

## 2024-06-07 PROCEDURE — 2720000007 HC OR 272 NO HCPCS: Performed by: STUDENT IN AN ORGANIZED HEALTH CARE EDUCATION/TRAINING PROGRAM

## 2024-06-07 PROCEDURE — 3700000001 HC GENERAL ANESTHESIA TIME - INITIAL BASE CHARGE: Performed by: STUDENT IN AN ORGANIZED HEALTH CARE EDUCATION/TRAINING PROGRAM

## 2024-06-07 PROCEDURE — 2500000004 HC RX 250 GENERAL PHARMACY W/ HCPCS (ALT 636 FOR OP/ED): Performed by: NURSE ANESTHETIST, CERTIFIED REGISTERED

## 2024-06-07 PROCEDURE — 2500000005 HC RX 250 GENERAL PHARMACY W/O HCPCS: Performed by: STUDENT IN AN ORGANIZED HEALTH CARE EDUCATION/TRAINING PROGRAM

## 2024-06-07 PROCEDURE — 3600000002 HC OR TIME - INITIAL BASE CHARGE - PROCEDURE LEVEL TWO: Performed by: STUDENT IN AN ORGANIZED HEALTH CARE EDUCATION/TRAINING PROGRAM

## 2024-06-07 PROCEDURE — 7100000010 HC PHASE TWO TIME - EACH INCREMENTAL 1 MINUTE: Performed by: STUDENT IN AN ORGANIZED HEALTH CARE EDUCATION/TRAINING PROGRAM

## 2024-06-07 PROCEDURE — 3700000002 HC GENERAL ANESTHESIA TIME - EACH INCREMENTAL 1 MINUTE: Performed by: STUDENT IN AN ORGANIZED HEALTH CARE EDUCATION/TRAINING PROGRAM

## 2024-06-07 PROCEDURE — 88305 TISSUE EXAM BY PATHOLOGIST: CPT | Mod: TC | Performed by: STUDENT IN AN ORGANIZED HEALTH CARE EDUCATION/TRAINING PROGRAM

## 2024-06-07 PROCEDURE — 2500000004 HC RX 250 GENERAL PHARMACY W/ HCPCS (ALT 636 FOR OP/ED): Performed by: STUDENT IN AN ORGANIZED HEALTH CARE EDUCATION/TRAINING PROGRAM

## 2024-06-07 PROCEDURE — 3600000007 HC OR TIME - EACH INCREMENTAL 1 MINUTE - PROCEDURE LEVEL TWO: Performed by: STUDENT IN AN ORGANIZED HEALTH CARE EDUCATION/TRAINING PROGRAM

## 2024-06-07 PROCEDURE — 2500000004 HC RX 250 GENERAL PHARMACY W/ HCPCS (ALT 636 FOR OP/ED): Performed by: ANESTHESIOLOGY

## 2024-06-07 PROCEDURE — 7100000009 HC PHASE TWO TIME - INITIAL BASE CHARGE: Performed by: STUDENT IN AN ORGANIZED HEALTH CARE EDUCATION/TRAINING PROGRAM

## 2024-06-07 RX ORDER — BUPIVACAINE HYDROCHLORIDE 5 MG/ML
INJECTION, SOLUTION PERINEURAL AS NEEDED
Status: DISCONTINUED | OUTPATIENT
Start: 2024-06-07 | End: 2024-06-07 | Stop reason: HOSPADM

## 2024-06-07 RX ORDER — SODIUM CHLORIDE 9 MG/ML
100 INJECTION, SOLUTION INTRAVENOUS CONTINUOUS
Status: DISCONTINUED | OUTPATIENT
Start: 2024-06-07 | End: 2024-06-07 | Stop reason: HOSPADM

## 2024-06-07 RX ORDER — HYDRALAZINE HYDROCHLORIDE 20 MG/ML
5 INJECTION INTRAMUSCULAR; INTRAVENOUS EVERY 30 MIN PRN
Status: DISCONTINUED | OUTPATIENT
Start: 2024-06-07 | End: 2024-06-07 | Stop reason: HOSPADM

## 2024-06-07 RX ORDER — ACETAMINOPHEN 325 MG/1
975 TABLET ORAL ONCE
Status: COMPLETED | OUTPATIENT
Start: 2024-06-07 | End: 2024-06-07

## 2024-06-07 RX ORDER — SODIUM CHLORIDE, SODIUM LACTATE, POTASSIUM CHLORIDE, CALCIUM CHLORIDE 600; 310; 30; 20 MG/100ML; MG/100ML; MG/100ML; MG/100ML
100 INJECTION, SOLUTION INTRAVENOUS CONTINUOUS
Status: DISCONTINUED | OUTPATIENT
Start: 2024-06-07 | End: 2024-06-07 | Stop reason: HOSPADM

## 2024-06-07 RX ORDER — ALBUTEROL SULFATE 0.83 MG/ML
2.5 SOLUTION RESPIRATORY (INHALATION) ONCE AS NEEDED
Status: DISCONTINUED | OUTPATIENT
Start: 2024-06-07 | End: 2024-06-07 | Stop reason: HOSPADM

## 2024-06-07 RX ORDER — FENTANYL CITRATE 50 UG/ML
INJECTION, SOLUTION INTRAMUSCULAR; INTRAVENOUS AS NEEDED
Status: DISCONTINUED | OUTPATIENT
Start: 2024-06-07 | End: 2024-06-07

## 2024-06-07 RX ORDER — OXYCODONE HYDROCHLORIDE 5 MG/1
5 TABLET ORAL EVERY 4 HOURS PRN
Status: DISCONTINUED | OUTPATIENT
Start: 2024-06-07 | End: 2024-06-07 | Stop reason: HOSPADM

## 2024-06-07 RX ORDER — INDOMETHACIN 25 MG/1
CAPSULE ORAL AS NEEDED
Status: DISCONTINUED | OUTPATIENT
Start: 2024-06-07 | End: 2024-06-07 | Stop reason: HOSPADM

## 2024-06-07 RX ORDER — MIDAZOLAM HYDROCHLORIDE 1 MG/ML
INJECTION INTRAMUSCULAR; INTRAVENOUS AS NEEDED
Status: DISCONTINUED | OUTPATIENT
Start: 2024-06-07 | End: 2024-06-07

## 2024-06-07 RX ORDER — LIDOCAINE HYDROCHLORIDE 10 MG/ML
INJECTION INFILTRATION; PERINEURAL AS NEEDED
Status: DISCONTINUED | OUTPATIENT
Start: 2024-06-07 | End: 2024-06-07 | Stop reason: HOSPADM

## 2024-06-07 RX ORDER — PROPOFOL 10 MG/ML
INJECTION, EMULSION INTRAVENOUS CONTINUOUS PRN
Status: DISCONTINUED | OUTPATIENT
Start: 2024-06-07 | End: 2024-06-07

## 2024-06-07 RX ORDER — DIPHENHYDRAMINE HYDROCHLORIDE 50 MG/ML
12.5 INJECTION INTRAMUSCULAR; INTRAVENOUS ONCE AS NEEDED
Status: DISCONTINUED | OUTPATIENT
Start: 2024-06-07 | End: 2024-06-07 | Stop reason: HOSPADM

## 2024-06-07 RX ORDER — ONDANSETRON HYDROCHLORIDE 2 MG/ML
4 INJECTION, SOLUTION INTRAVENOUS ONCE AS NEEDED
Status: DISCONTINUED | OUTPATIENT
Start: 2024-06-07 | End: 2024-06-07 | Stop reason: HOSPADM

## 2024-06-07 RX ADMIN — ACETAMINOPHEN 975 MG: 325 TABLET ORAL at 08:07

## 2024-06-07 RX ADMIN — MIDAZOLAM HYDROCHLORIDE 2 MG: 1 INJECTION, SOLUTION INTRAMUSCULAR; INTRAVENOUS at 09:09

## 2024-06-07 RX ADMIN — SODIUM CHLORIDE, POTASSIUM CHLORIDE, SODIUM LACTATE AND CALCIUM CHLORIDE 100 ML/HR: 600; 310; 30; 20 INJECTION, SOLUTION INTRAVENOUS at 08:03

## 2024-06-07 RX ADMIN — FENTANYL CITRATE 50 MCG: 50 INJECTION, SOLUTION INTRAMUSCULAR; INTRAVENOUS at 09:18

## 2024-06-07 RX ADMIN — Medication 6 L/MIN: at 09:31

## 2024-06-07 RX ADMIN — FENTANYL CITRATE 50 MCG: 50 INJECTION, SOLUTION INTRAMUSCULAR; INTRAVENOUS at 09:13

## 2024-06-07 RX ADMIN — PROPOFOL 250 MCG/KG/MIN: 10 INJECTION, EMULSION INTRAVENOUS at 09:13

## 2024-06-07 SDOH — HEALTH STABILITY: MENTAL HEALTH: CURRENT SMOKER: 0

## 2024-06-07 ASSESSMENT — PAIN - FUNCTIONAL ASSESSMENT
PAIN_FUNCTIONAL_ASSESSMENT: 0-10
PAIN_FUNCTIONAL_ASSESSMENT: UNABLE TO SELF-REPORT

## 2024-06-07 ASSESSMENT — PAIN SCALES - GENERAL
PAINLEVEL_OUTOF10: 0 - NO PAIN

## 2024-06-07 ASSESSMENT — COLUMBIA-SUICIDE SEVERITY RATING SCALE - C-SSRS
2. HAVE YOU ACTUALLY HAD ANY THOUGHTS OF KILLING YOURSELF?: NO
1. IN THE PAST MONTH, HAVE YOU WISHED YOU WERE DEAD OR WISHED YOU COULD GO TO SLEEP AND NOT WAKE UP?: NO
6. HAVE YOU EVER DONE ANYTHING, STARTED TO DO ANYTHING, OR PREPARED TO DO ANYTHING TO END YOUR LIFE?: NO

## 2024-06-07 ASSESSMENT — ENCOUNTER SYMPTOMS
CARDIOVASCULAR NEGATIVE: 1
NEUROLOGICAL NEGATIVE: 1
CONSTITUTIONAL NEGATIVE: 1
PSYCHIATRIC NEGATIVE: 1
GASTROINTESTINAL NEGATIVE: 1
RESPIRATORY NEGATIVE: 1
EYES NEGATIVE: 1
MUSCULOSKELETAL NEGATIVE: 1

## 2024-06-07 NOTE — ANESTHESIA POSTPROCEDURE EVALUATION
Patient: Josemanuel Gorman    Procedure Summary       Date: 06/07/24 Room / Location: [facility] A OR 02 / Virtual [facility] A OR    Anesthesia Start: 0904 Anesthesia Stop: 0935    Procedure: Transperinal Prostate Biopsy (Bilateral) Diagnosis:       Elevated PSA measurement      (Elevated PSA measurement [R97.20])    Surgeons: Jerald Pineda MD Responsible Provider: Dima Varela MD    Anesthesia Type: MAC ASA Status: 2            Anesthesia Type: MAC    Vitals Value Taken Time   /69 06/07/24 1000   Temp 36.5 °C (97.7 °F) 06/07/24 1000   Pulse 54 06/07/24 1000   Resp 15 06/07/24 1000   SpO2 98 % 06/07/24 1000       Anesthesia Post Evaluation    Patient location during evaluation: PACU  Patient participation: complete - patient participated  Level of consciousness: awake  Pain management: adequate  Multimodal analgesia pain management approach  Airway patency: patent  Cardiovascular status: acceptable  Respiratory status: acceptable  Hydration status: acceptable  Postoperative Nausea and Vomiting: none  Comments: Will continue to assess PONV status.        No notable events documented.

## 2024-06-07 NOTE — H&P
History Of Present Illness  Josemanuel Gorman is a 68 y.o. male with a history of elevated PSA and a PI-RADS 5 lesion on prostate MRI.  Patient presents today to undergo fusion biopsy of the prostate.     Past Medical History  Past Medical History:   Diagnosis Date    Encounter for general adult medical examination without abnormal findings 06/24/2021    Encounter for Medicare annual wellness exam    Encounter for general adult medical examination without abnormal findings 04/10/2019    Welcome to Medicare preventive visit    Encounter for immunization     Immunization due    Encounter for screening for malignant neoplasm of colon 04/03/2018    Colon cancer screening    GERD (gastroesophageal reflux disease) 6/2018    HL (hearing loss) 6/2008    Hypertension 6/2006    Personal history of other drug therapy 04/10/2019    History of pneumococcal vaccination    Personal history of other drug therapy 06/24/2021    History of drug therapy       Surgical History  Past Surgical History:   Procedure Laterality Date    COLONOSCOPY W/ POLYPECTOMY  09/22/2014    Complete Colonoscopy For Polyp Removal    OTHER SURGICAL HISTORY  03/26/2014    Lipectomy Of Arm        Social History  He reports that he has never smoked. He has never been exposed to tobacco smoke. He has never used smokeless tobacco. He reports that he does not currently use alcohol after a past usage of about 2.0 standard drinks of alcohol per week. He reports that he does not use drugs.    Family History  Family History   Problem Relation Name Age of Onset    Hypertension Father JR     Multiple sclerosis Sister      Hypertension Brother Rick         Allergies  Patient has no known allergies.    Review of Systems   Constitutional: Negative.    HENT: Negative.     Eyes: Negative.    Respiratory: Negative.     Cardiovascular: Negative.    Gastrointestinal: Negative.    Musculoskeletal: Negative.    Neurological: Negative.    Psychiatric/Behavioral: Negative.     All  "other systems reviewed and are negative.       Physical Exam  Vitals reviewed.   Constitutional:       Appearance: Normal appearance.   HENT:      Head: Normocephalic and atraumatic.      Mouth/Throat:      Mouth: Mucous membranes are moist.      Pharynx: Oropharynx is clear.   Eyes:      Extraocular Movements: Extraocular movements intact.      Pupils: Pupils are equal, round, and reactive to light.   Cardiovascular:      Rate and Rhythm: Normal rate and regular rhythm.   Pulmonary:      Effort: Pulmonary effort is normal.      Breath sounds: Normal breath sounds.   Abdominal:      General: There is no distension.      Palpations: Abdomen is soft.      Tenderness: There is no abdominal tenderness.   Musculoskeletal:         General: Normal range of motion.      Cervical back: Normal range of motion.   Skin:     General: Skin is warm.      Coloration: Skin is not jaundiced.   Neurological:      General: No focal deficit present.      Mental Status: He is alert and oriented to person, place, and time.          Last Recorded Vitals  Blood pressure 160/80, pulse 65, temperature 36.5 °C (97.7 °F), temperature source Temporal, resp. rate 19, height 1.803 m (5' 11\"), weight 94.1 kg (207 lb 7.3 oz), SpO2 98%.    Relevant Results       Assessment/Plan   Principal Problem:    Elevated PSA measurement  Active Problems:    Difficult intravenous access    Gastroesophageal reflux disease      Consent obtained to proceed to the operating room         Jerald Pineda MD    "

## 2024-06-07 NOTE — OP NOTE
Transperinal Prostate Biopsy (B) Operative Note     Date: 2024  OR Location: [facility] A OR    Name: Josemanuel Gorman, : 1955, Age: 68 y.o., MRN: [id number], Sex: male    Diagnosis  Pre-op Diagnosis     * Elevated PSA measurement [R97.20] Post-op Diagnosis     * Elevated PSA measurement [R97.20]     Procedures  Transperinal Prostate Biopsy  03663 - IN PROSTATE NEEDLE BIOPSY ANY APPROACH    IN BIOPSY OF PROSTATE,NEEDLE,TRANSPERINEAL [A21531]  CHG US TRANSRECTAL [93075]  Surgeons      * Jerald Pineda - Primary    Resident/Fellow/Other Assistant:  Surgeons and Role:  * No surgeons found with a matching role *    Procedure Summary  Anesthesia: Monitor Anesthesia Care  ASA: II  Anesthesia Staff: Anesthesiologist: Dima Varela MD  CRNA: JAYLAN Kamara-CRNA  Estimated Blood Loss: 2mL  Intra-op Medications:   Administrations occurring from 0850 to 0930 on 24:   Medication Name Total Dose   BUPivacaine HCl (Marcaine) 0.5 % (5 mg/mL) injection 8 mL   lidocaine (Xylocaine) 10 mg/mL (1 %) injection 8 mL   sodium bicarbonate 1 mEq/mL (8.4 %) injection 40 mEq   lactated Ringer's infusion Cannot be calculated              Anesthesia Record               Intraprocedure I/O Totals          Intake    Propofol Drip 0.00 mL    The total shown is the total volume documented since Anesthesia Start was filed.    lactated Ringer's infusion 300.00 mL    Total Intake 300 mL          Specimen:   ID Type Source Tests Collected by Time   1 : Left Paramedian [place] Tissue PROSTATE NEEDLE BIOPSY LEFT SURGICAL PATHOLOGY EXAM Jerald Pineda MD 2024   2 : Left Paramedian [place] Tissue PROSTATE NEEDLE BIOPSY LEFT SURGICAL PATHOLOGY EXAM Jerald Pineda MD 2024   3 : Left Posterior [place] Tissue PROSTATE NEEDLE BIOPSY LEFT SURGICAL PATHOLOGY EXAM Jerald Pineda MD 2024   4 : Left Posterior [place] Tissue PROSTATE NEEDLE BIOPSY LEFT SURGICAL PATHOLOGY EXAM Jerald Pineda MD 2024   5 : Left Lateral Tissue  PROSTATE NEEDLE BIOPSY LEFT SURGICAL PATHOLOGY EXAM Jerald Pineda MD 6/7/2024 0902   6 : Left Anterior Tissue PROSTATE NEEDLE BIOPSY LEFT SURGICAL PATHOLOGY EXAM Jerald Pineda MD 6/7/2024 0902   7 : Right Paramedian [place] Tissue PROSTATE NEEDLE BIOPSY RIGHT SURGICAL PATHOLOGY EXAM Jerald Pineda MD 6/7/2024 0902   8 : Right Paramedian [place] Tissue PROSTATE NEEDLE BIOPSY RIGHT SURGICAL PATHOLOGY EXAM Jerald Pineda MD 6/7/2024 0902   9 : Right Posterior [place] Tissue PROSTATE NEEDLE BIOPSY RIGHT SURGICAL PATHOLOGY EXAM Jerald Pineda MD 6/7/2024 0902   10 : Right Posterior [place] Tissue PROSTATE NEEDLE BIOPSY RIGHT SURGICAL PATHOLOGY EXAM Jerald Pineda MD 6/7/2024 0902   11 : Right Lateral Tissue PROSTATE NEEDLE BIOPSY RIGHT SURGICAL PATHOLOGY EXAM Jerald Pineda MD 6/7/2024 0902   12 : Right Anterior Tissue PROSTATE NEEDLE BIOPSY RIGHT SURGICAL PATHOLOGY EXAM Jerald Pineda MD 6/7/2024 0902   13 : 1 / 1 Tissue PROSTATE BIOPSY TARGETED [place]OE[place] SURGICAL PATHOLOGY EXAM Jerald Pineda MD 6/7/2024 0902   14 : 1 / 2 Tissue PROSTATE BIOPSY TARGETED [place]OEBE SURGICAL PATHOLOGY EXAM Jerald Pineda MD 6/7/2024 0902   15 : 1 / 3 Tissue PROSTATE BIOPSY TARGETED PHOEBE SURGICAL PATHOLOGY EXAM Jerald Pineda MD 6/7/2024 0902   16 : 1 / 4 Tissue PROSTATE BIOPSY TARGETED PHOEBE SURGICAL PATHOLOGY EXAM Jerald Pineda MD 6/7/2024 0902        Staff:   Circulator: Serina Perez Person: Katina  Circulator: Mookie    Preoperative Diagnosis: Elevated PSA; abnormal prostate findings on MRI;     Postoperative Diagnosis: Same    Operation Performed: MR/TRUS fusion guided biopsy via transperineal approach    Attending: Edwin    Assistant(s):     Anesthesia: Sedation and Local    Preparation: Betadine    EBL: Minimal     Complications: None     Indications for procedure: This 68 y.o. year old male presents with a history of elevated PSA and a PI-RADS 5 lesion on prostate MRI.    MRI Findings: IMPRESSION:  1. T2 hypointense, markedly diffusion restricting,  and nonenhancing  left posteromedial peripheral zone lesion with mild extension towards  the right in the prostate base measuring 3.2 x 0.6 cm (PI-RADS 5). Of  note, the peripheral hypointensity is slightly decreased compared to  prior examination. Lesion abuts the capsule with no gross  extracapsular extension.  2. Benign prostatic hyperplasia.    Procedure and Findings:     The patient was seen in the preoperative area. The risks, benefits, complications, treatment options, non-operative alternatives, expected recovery and outcomes were discussed with the patient. The possibilities of reaction to medication, pulmonary aspiration, injury to surrounding structures, bleeding, recurrent infection, the need for additional procedures, failure to diagnose a condition, and creating a complication requiring transfusion or operation were discussed with the patient. The patient concurred with the proposed plan, giving informed consent.  The site of surgery was properly noted/marked if necessary per policy. The patient has been actively warmed in preoperative area. Preoperative antibiotics are not indicated. Venous thrombosis prophylaxis have been ordered including bilateral sequential compression devices    The rational for transrectal ultrasound and biopsy of the prostate including risk, benefits and alternatives were discussed. These included risk of increased urination, urinary retention, hematuria, hematospermia, and urosepsis, the patient elected to proceed.      The multiparametric MRI data was imported from the radiology PACS system to the UroNav biopsy platform. The T2-weighted images were reviewed including the segmented prostate boundary and pre-identified suspicious lesions (ROIs).     A procedure time out confirmed the proper patient, and the procedure informed consent form had been signed by the patient.    The patient was placed in lithotomy position. A injection mixture of lidocaine, marcaine and sodium  bicarbonate was used as local anesthetic for the skin of the perineum and to perform a periapical block.     The electromagnetic sensor was attached to the ultrasound probe. The ultrasound transducer was placed into the rectum. Positioning of the probe and sensor within the generated EM-field was confirmed.     A comprehensive TRUS survey was performed with the prostate visualized in both the transverse and sagittal planes. There were benign calcifications seen on ultrasound. In addition to the MRI fusion, there were hypoechoic lesions suspicious for tumor identified on ultrasound.     3D-Rendering with trus image processing and fusion:    In the axial plane, an ultrasound sweep of the prostate was completed from base to apex. The serial axial image slices were marked by annotating the anterior, posterior, left, right, base and apical points for semi-automatic segmentation of the prostate. Manual adjustments of the prostate US segmentation was performed in the axial, sagittal and coronal views rendering a 3-D data set/US volume.     Initial rotational co-registration was performed by blending MR images that were overlaid on the US images. The urethra, bladder neck, bladder and posterior surface of the prostate were identified on both MRI and US.     The images were rotated to ensure corresponding anatomy was correctly aligned.     Elastic registration was then calculated for use if required.     The MRI and Ultrasound were then registered using co-display of the images verifying that base, apex, left and right sides of the prostate were correctly aligned. Manual corrections were performed where need. Additional co-registration of the internal fiducials including prostatic cyst and the pubis were performed.     Prostate Biopsy:     At this point, accurate co-registration/fusion was confirmed. Ultrasound was used to navigate to the centroid target and lesion volume. The segmented ROIs were targeted and biopsied with  ultrasound guidance taking four cores from each target. I was satisfied with the location of the targeted biopsies obtained. An additional 12 core, standard, systematic random biopsy was performed for a total of 16 cores of tissue obtained during this biopsy session.      Disposition  Patient tolerated the procedure well and will follow-up for an outpatient appointment to discuss pathology.      Attending Attestation: I was present and scrubbed for the entire procedure.    Jerald Pineda  Phone Number: [phone]

## 2024-06-07 NOTE — DISCHARGE SUMMARY
Discharge Diagnosis  Elevated PSA measurement    Issues Requiring Follow-Up  Prostate biopsy follow-up    Test Results Pending At Discharge  Pending Labs       No current pending labs.            Hospital Course   Patient presented for fusion biopsy of the prostate.  Patient tolerated the surgery well was discharged home in stable condition.    Pertinent Physical Exam At Time of Discharge  Physical Exam  Vitals reviewed.   Constitutional:       Appearance: Normal appearance.   HENT:      Head: Normocephalic and atraumatic.   Eyes:      Extraocular Movements: Extraocular movements intact.      Pupils: Pupils are equal, round, and reactive to light.   Cardiovascular:      Rate and Rhythm: Normal rate and regular rhythm.   Pulmonary:      Effort: Pulmonary effort is normal.      Breath sounds: Normal breath sounds.   Abdominal:      General: There is no distension.      Palpations: Abdomen is soft.      Tenderness: There is no abdominal tenderness.   Musculoskeletal:      Cervical back: Normal range of motion.   Neurological:      General: No focal deficit present.      Mental Status: He is alert and oriented to person, place, and time.   Psychiatric:         Mood and Affect: Mood normal.         Behavior: Behavior normal.         Home Medications     Medication List      ASK your doctor about these medications     lisinopriL-hydrochlorothiazide 20-12.5 mg tablet; TAKE 1 TABLET BY MOUTH   DAILY   tadalafil 20 mg tablet; Commonly known as: Cialis; TAKE ONE TABLET BY   MOUTH DAILY 1 HOUR BEFORE NEEDED.  GoodRx please       Outpatient Follow-Up  Future Appointments   Date Time Provider Department [place]   7/1/2024  1:40 PM Jerald Pineda MD [facility]   7/30/2024  1:00 PM Dima Whiting MD [id number]       Jerald Pineda MD

## 2024-06-07 NOTE — ANESTHESIA PREPROCEDURE EVALUATION
Patient: Josemanuel Gorman    Procedure Information       Date/Time: 06/07/24 0850    Procedure: Transperinal Prostate Biopsy (Bilateral)    Location: [facility] OR 02 / [facility] OR    Surgeons: Jerald Pineda MD            Relevant Problems   Anesthesia   (+) Difficult intravenous access      Cardiac   (+) Benign essential hypertension      GI   (+) Gastroesophageal reflux disease (OTC prn)       Clinical information reviewed:   Tobacco  Allergies  Meds   Med Hx  Surg Hx   Fam Hx  Soc Hx        NPO Detail:  NPO/Void Status  Carbohydrate Drink Given Prior to Surgery? : N  Date of Last Liquid: 06/06/24  Time of Last Liquid: 2100  Date of Last Solid: 06/06/24  Time of Last Solid: 1930  Last Intake Type: Clear fluids (water)  Time of Last Void: 0745         Physical Exam    Airway  Mallampati: III  TM distance: >3 FB  Neck ROM: full     Cardiovascular    Dental    Pulmonary    Abdominal            Anesthesia Plan    History of general anesthesia?: yes  History of complications of general anesthesia?: no    ASA 2     MAC     The patient is not a current smoker.    intravenous induction   Anesthetic plan and risks discussed with patient and spouse.    Plan discussed with CRNA.

## 2024-06-07 NOTE — PERIOPERATIVE NURSING NOTE
0931 Pt arrived to [facility] at this time, report received from OR and anesthesia staff. Phase 1 care started. Assuming care of pt at this time. Bedside report received from outgoing RN. Message sent to family via text at this time.     0938 nasal trumpet and o2 removed     0945 becoming more alert, otherwise no assessment changes     0949 pt given water, declining snack     0953 Dr Pineda at bedside    1000 pt denies pain/nausea, no assessment changes   Pt meets discharge criteria from phase 1 at this time

## 2024-06-07 NOTE — PERIOPERATIVE NURSING NOTE
1001: Phase 2 care begins  1015: Discharge instructions reviewed with pt and friend, all questions answered at this time  1025: IV removed  1029: Pt transported to [facility]

## 2024-06-10 ASSESSMENT — PAIN SCALES - GENERAL: PAINLEVEL_OUTOF10: 0 - NO PAIN

## 2024-06-20 LAB
LAB AP ASR DISCLAIMER: NORMAL
LABORATORY COMMENT REPORT: NORMAL
PATH REPORT.FINAL DX SPEC: NORMAL
PATH REPORT.GROSS SPEC: NORMAL
PATH REPORT.RELEVANT HX SPEC: NORMAL
PATH REPORT.TOTAL CANCER: NORMAL

## 2024-06-26 NOTE — PROGRESS NOTES
UROLOGIC FOLLOW-UP VISIT     PROBLEM LIST:  1. Elevated PSA             HISTORY OF PRESENT ILLNESS:   67-year-old male with a history of elevated PSA who subsequently underwent a prostate MRI which showed a PI-RADS 5 lesion. Patient previously underwent a prostate MRI in 2021 which was negative for any clinically significant lesions. PSA 10/30/2023 7.14, 1/29/2024 6.42, 5/15/24 8.30. Patient underwent fusion biopsy on 3/22/23, which was negative, and presents today for pathology results. He underwent repeat MR guided prostate biopsy on 6/7/24 and presents today for pathology results.  Denies any hematuria, f/c/n/v.      PAST MEDICAL HISTORY:  Past Medical History:   Diagnosis Date    Encounter for general adult medical examination without abnormal findings 06/24/2021    Encounter for Medicare annual wellness exam    Encounter for general adult medical examination without abnormal findings 04/10/2019    Welcome to Medicare preventive visit    Encounter for immunization     Immunization due    Encounter for screening for malignant neoplasm of colon 04/03/2018    Colon cancer screening    GERD (gastroesophageal reflux disease) 6/2018    HL (hearing loss) 6/2008    Hypertension 6/2006    Personal history of other drug therapy 04/10/2019    History of pneumococcal vaccination    Personal history of other drug therapy 06/24/2021    History of drug therapy       PAST SURGICAL HISTORY:  Past Surgical History:   Procedure Laterality Date    COLONOSCOPY W/ POLYPECTOMY  09/22/2014    Complete Colonoscopy For Polyp Removal    OTHER SURGICAL HISTORY  03/26/2014    Lipectomy Of Arm        ALLERGIES:   No Known Allergies     MEDICATIONS:     Current Outpatient Medications:     lisinopriL-hydrochlorothiazide 20-12.5 mg tablet, TAKE 1 TABLET BY MOUTH DAILY, Disp: 100 tablet, Rfl: 3    tadalafil 20 mg tablet, TAKE ONE TABLET BY MOUTH DAILY 1 HOUR BEFORE NEEDED.  GoodRx please, Disp: 30 tablet, Rfl: 1      SOCIAL HISTORY:  Patient   reports that he has never smoked. He has never been exposed to tobacco smoke. He has never used smokeless tobacco. He reports that he does not currently use alcohol after a past usage of about 2.0 standard drinks of alcohol per week. He reports that he does not use drugs.   Social History     Socioeconomic History    Marital status: Single     Spouse name: Not on file    Number of children: Not on file    Years of education: Not on file    Highest education level: Not on file   Occupational History    Not on file   Tobacco Use    Smoking status: Never     Passive exposure: Never    Smokeless tobacco: Never   Vaping Use    Vaping status: Never Used   Substance and Sexual Activity    Alcohol use: Not Currently     Alcohol/week: 2.0 standard drinks of alcohol     Types: 2 Cans of beer per week    Drug use: Never    Sexual activity: Yes     Partners: Female     Birth control/protection: None   Other Topics Concern    Not on file   Social History Narrative    Not on file     Social Determinants of Health     Financial Resource Strain: Not on file   Food Insecurity: Not on file   Transportation Needs: Not on file   Physical Activity: Not on file   Stress: Not on file   Social Connections: Not on file   Intimate Partner Violence: Not on file   Housing Stability: Not on file       FAMILY HISTORY:  Family History   Problem Relation Name Age of Onset    Hypertension Father JR     Multiple sclerosis Sister      Hypertension Brother Rick        REVIEW OF SYSTEMS:   Constitutional: Negative for fever and chills. Denies anorexia, weight loss.  Eyes: Negative for visual disturbance.   Respiratory: Negative for shortness of breath.    Cardiovascular: Negative for chest pain.   Gastrointestinal: Negative for nausea and vomiting.   Genitourinary: See interval history above.  Skin: Negative for rash.   Neurological: Negative for dizziness and numbness.   Psychiatric/Behavioral: Negative for confusion and decreased concentration.      PHYSICAL EXAM:  There were no vitals taken for this visit.  Constitutional: Patient appears well-developed and well-nourished. No distress.    Head: Normocephalic and atraumatic.    Neck: Normal range of motion.    Cardiovascular: Normal rate.    Pulmonary/Chest: Effort normal. No respiratory distress.   Abdominal: soft NTND  Musculoskeletal: Normal range of motion.    Neurological: Alert and oriented to person, place, and time.  Psychiatric: Normal mood and affect. Behavior is normal. Thought content normal.      LABORATORY REVIEW:     Lab Results   Component Value Date    BUN 19 07/18/2023    CREATININE 0.66 07/18/2023     07/18/2023    K 4.3 07/18/2023     07/18/2023    CO2 26 07/18/2023    CALCIUM 9.2 07/18/2023      Lab Results   Component Value Date    WBC 6.3 07/18/2023    RBC 4.50 07/18/2023    HGB 14.5 07/18/2023    HCT 43.5 07/18/2023    MCV 97 07/18/2023    MCHC 33.3 07/18/2023    RDW 13.2 07/18/2023     07/18/2023        Lab Results   Component Value Date    PSA 8.30 (H) 05/15/2024    PSA 6.42 (H) 01/29/2024    PSA 7.14 (H) 10/30/2023    PSA 6.52 (H) 07/18/2023    PSA 7.12 (H) 03/01/2023    PSA 7.87 (H) 01/05/2023    PSA 8.0 (H) 10/03/2022    PSA 7.6 (H) 06/21/2022    PSA 8.5 (H) 02/15/2022    PSA 8.8 (H) 07/02/2021         Assessment:      1. Elevated PSA             Plan:    Reviewed and interpreted patient's PSA trend   Reviewed and interpreted patient's pathology report    Counseled patient that his biopsy report was negative for cancer    RTC in 6mo with PSA prior     28 minutes total spent on patient's care today; >50% time spent on counseling/coordination of care

## 2024-07-01 ENCOUNTER — OFFICE VISIT (OUTPATIENT)
Dept: UROLOGY | Facility: HOSPITAL | Age: 69
End: 2024-07-01
Payer: MEDICARE

## 2024-07-01 DIAGNOSIS — R97.20 ELEVATED PSA: Primary | ICD-10-CM

## 2024-07-01 PROCEDURE — 99213 OFFICE O/P EST LOW 20 MIN: CPT | Performed by: STUDENT IN AN ORGANIZED HEALTH CARE EDUCATION/TRAINING PROGRAM

## 2024-07-30 ENCOUNTER — APPOINTMENT (OUTPATIENT)
Dept: PRIMARY CARE | Facility: CLINIC | Age: 69
End: 2024-07-30
Payer: MEDICARE

## 2024-07-30 ENCOUNTER — LAB (OUTPATIENT)
Dept: LAB | Facility: LAB | Age: 69
End: 2024-07-30
Payer: MEDICARE

## 2024-07-30 VITALS
SYSTOLIC BLOOD PRESSURE: 121 MMHG | DIASTOLIC BLOOD PRESSURE: 64 MMHG | OXYGEN SATURATION: 99 % | HEART RATE: 57 BPM | TEMPERATURE: 97.2 F

## 2024-07-30 DIAGNOSIS — Z00.00 MEDICARE ANNUAL WELLNESS VISIT, SUBSEQUENT: ICD-10-CM

## 2024-07-30 DIAGNOSIS — R97.20 ELEVATED PSA: ICD-10-CM

## 2024-07-30 DIAGNOSIS — I10 BENIGN ESSENTIAL HYPERTENSION: ICD-10-CM

## 2024-07-30 DIAGNOSIS — Z12.11 SCREENING FOR MALIGNANT NEOPLASM OF COLON: ICD-10-CM

## 2024-07-30 DIAGNOSIS — N42.31 HIGH GRADE PROSTATIC INTRAEPITHELIAL NEOPLASIA: ICD-10-CM

## 2024-07-30 DIAGNOSIS — N52.9 ERECTILE DYSFUNCTION, UNSPECIFIED ERECTILE DYSFUNCTION TYPE: ICD-10-CM

## 2024-07-30 DIAGNOSIS — I10 BENIGN ESSENTIAL HYPERTENSION: Primary | ICD-10-CM

## 2024-07-30 PROCEDURE — 84153 ASSAY OF PSA TOTAL: CPT

## 2024-07-30 PROCEDURE — 3078F DIAST BP <80 MM HG: CPT | Performed by: FAMILY MEDICINE

## 2024-07-30 PROCEDURE — 85025 COMPLETE CBC W/AUTO DIFF WBC: CPT

## 2024-07-30 PROCEDURE — 3074F SYST BP LT 130 MM HG: CPT | Performed by: FAMILY MEDICINE

## 2024-07-30 PROCEDURE — G0439 PPPS, SUBSEQ VISIT: HCPCS | Performed by: FAMILY MEDICINE

## 2024-07-30 PROCEDURE — 1160F RVW MEDS BY RX/DR IN RCRD: CPT | Performed by: FAMILY MEDICINE

## 2024-07-30 PROCEDURE — 1159F MED LIST DOCD IN RCRD: CPT | Performed by: FAMILY MEDICINE

## 2024-07-30 PROCEDURE — 80061 LIPID PANEL: CPT

## 2024-07-30 PROCEDURE — 1036F TOBACCO NON-USER: CPT | Performed by: FAMILY MEDICINE

## 2024-07-30 PROCEDURE — 1123F ACP DISCUSS/DSCN MKR DOCD: CPT | Performed by: FAMILY MEDICINE

## 2024-07-30 PROCEDURE — 36415 COLL VENOUS BLD VENIPUNCTURE: CPT

## 2024-07-30 PROCEDURE — 80053 COMPREHEN METABOLIC PANEL: CPT

## 2024-07-30 PROCEDURE — 1170F FXNL STATUS ASSESSED: CPT | Performed by: FAMILY MEDICINE

## 2024-07-30 ASSESSMENT — ACTIVITIES OF DAILY LIVING (ADL)
TAKING_MEDICATION: INDEPENDENT
MANAGING_FINANCES: INDEPENDENT
BATHING: INDEPENDENT
DRESSING: INDEPENDENT
DOING_HOUSEWORK: INDEPENDENT
GROCERY_SHOPPING: INDEPENDENT

## 2024-07-30 NOTE — PROGRESS NOTES
Subjective   Patient ID: Josemanuel Gorman is a 68 y.o. male who presents for Medicare Annual Wellness Visit Subsequent (Josemanuel is here today for his Annual Medicare Well check. Pt states he would like a Derm referral. ).    HPI   Josemanuel was seen today for a routine follow-up of his hypertension, and an annual Medicare wellness review.    Medication(s) are being taken and tolerated as prescribed, without concerns, list reconciled today.  There are no complaints of chest pain, shortness of breath, lower extremity edema, or exertional concerns  He overall feels well, is fasting for labs today.  Previous labs checked 1 year ago.  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  He is due for PSA right about now, also has 1 ordered for January, follow-up with urology shortly thereafter.    Medicare Wellness Checklist:  Flu, pneumonia, Shingrix series are up-to-date  He is due for Tdap  Colonoscopy was done February 26, 2019, 2 adenomas, 1 hyperplastic polyp.  No screening AAA ultrasound warranted  Review of Systems  The full review of systems is negative with the exception of what is noted in HPI    Objective   /64 (BP Location: Right arm, Patient Position: Sitting, BP Cuff Size: Large adult)   Pulse 57   Temp 36.2 °C (97.2 °F) (Temporal)   SpO2 99%     Physical Exam  Constitutional/General appearance: alert, oriented, well-appearing, in no distress  Head and face exam is normal  No scleral icterus or conjunctival erythema present  Hearing is significantly reduced  Respiratory effort is normal, no dyspnea noted  Cortical function is normal  Mood, affect, are pleasant, appropriate, and interactive.  Insight is normal  Cardiac exam reveals a regular rate and rhythm  Lungs are clear bilaterally.    No lower extremity edema present.  Onychomycosis toenail changes present    Assessment/Plan     Hypertension--- since  today's blood pressures are at goal, I have recommended continuing the current treatment regimen, including medication as noted above, as well as a low salt, low-fat, high-fiber diet.  Exercise is to be continued as able and tolerated.  We will continue to follow the high blood pressure on an every six-month basis, and address additional needs should they arise.    Continue tadalafil for needed  See HPI for Medicare wellness checklist, just needs:  Colonoscopy--order placed  Tdap (Boostrix = tetanus booster)--needs to get at local pharmacy    Dermatology referral requested:  [facility] - [place]   [facility] [place] [address] [phone]     Check labs today  Keep up urology follow-up    Follow-up in 1 year  **Portions of this medical record have been created using voice recognition software and may have minor errors which are inherent in voice recognition systems. It has not been fully edited for typographical or grammatical errors**

## 2024-07-31 LAB
ALBUMIN SERPL BCP-MCNC: 4.9 G/DL (ref 3.4–5)
ALP SERPL-CCNC: 86 U/L (ref 33–136)
ALT SERPL W P-5'-P-CCNC: 16 U/L (ref 10–52)
ANION GAP SERPL CALC-SCNC: 14 MMOL/L (ref 10–20)
AST SERPL W P-5'-P-CCNC: 14 U/L (ref 9–39)
BASOPHILS # BLD AUTO: 0.02 X10*3/UL (ref 0–0.1)
BASOPHILS NFR BLD AUTO: 0.4 %
BILIRUB SERPL-MCNC: 0.8 MG/DL (ref 0–1.2)
BUN SERPL-MCNC: 21 MG/DL (ref 6–23)
CALCIUM SERPL-MCNC: 9.7 MG/DL (ref 8.6–10.6)
CHLORIDE SERPL-SCNC: 101 MMOL/L (ref 98–107)
CHOLEST SERPL-MCNC: 188 MG/DL (ref 0–199)
CHOLESTEROL/HDL RATIO: 4.2
CO2 SERPL-SCNC: 27 MMOL/L (ref 21–32)
CREAT SERPL-MCNC: 0.69 MG/DL (ref 0.5–1.3)
EGFRCR SERPLBLD CKD-EPI 2021: >90 ML/MIN/1.73M*2
EOSINOPHIL # BLD AUTO: 0.07 X10*3/UL (ref 0–0.7)
EOSINOPHIL NFR BLD AUTO: 1.3 %
ERYTHROCYTE [DISTWIDTH] IN BLOOD BY AUTOMATED COUNT: 13.2 % (ref 11.5–14.5)
GLUCOSE SERPL-MCNC: 100 MG/DL (ref 74–99)
HCT VFR BLD AUTO: 43.9 % (ref 41–52)
HDLC SERPL-MCNC: 44.4 MG/DL
HGB BLD-MCNC: 14.6 G/DL (ref 13.5–17.5)
IMM GRANULOCYTES # BLD AUTO: 0.02 X10*3/UL (ref 0–0.7)
IMM GRANULOCYTES NFR BLD AUTO: 0.4 % (ref 0–0.9)
LDLC SERPL CALC-MCNC: 126 MG/DL
LYMPHOCYTES # BLD AUTO: 2.05 X10*3/UL (ref 1.2–4.8)
LYMPHOCYTES NFR BLD AUTO: 37.4 %
MCH RBC QN AUTO: 31.9 PG (ref 26–34)
MCHC RBC AUTO-ENTMCNC: 33.3 G/DL (ref 32–36)
MCV RBC AUTO: 96 FL (ref 80–100)
MONOCYTES # BLD AUTO: 0.44 X10*3/UL (ref 0.1–1)
MONOCYTES NFR BLD AUTO: 8 %
NEUTROPHILS # BLD AUTO: 2.88 X10*3/UL (ref 1.2–7.7)
NEUTROPHILS NFR BLD AUTO: 52.5 %
NON HDL CHOLESTEROL: 144 MG/DL (ref 0–149)
NRBC BLD-RTO: 0 /100 WBCS (ref 0–0)
PLATELET # BLD AUTO: 237 X10*3/UL (ref 150–450)
POTASSIUM SERPL-SCNC: 4.3 MMOL/L (ref 3.5–5.3)
PROT SERPL-MCNC: 7.2 G/DL (ref 6.4–8.2)
PSA SERPL-MCNC: 8.22 NG/ML
RBC # BLD AUTO: 4.58 X10*6/UL (ref 4.5–5.9)
SODIUM SERPL-SCNC: 138 MMOL/L (ref 136–145)
TRIGL SERPL-MCNC: 89 MG/DL (ref 0–149)
VLDL: 18 MG/DL (ref 0–40)
WBC # BLD AUTO: 5.5 X10*3/UL (ref 4.4–11.3)

## 2024-08-08 ENCOUNTER — TELEPHONE (OUTPATIENT)
Dept: SURGERY | Facility: CLINIC | Age: 69
End: 2024-08-08
Payer: MEDICARE

## 2024-08-08 DIAGNOSIS — N52.9 ERECTILE DYSFUNCTION, UNSPECIFIED ERECTILE DYSFUNCTION TYPE: ICD-10-CM

## 2024-08-08 RX ORDER — TADALAFIL 20 MG/1
TABLET ORAL
Qty: 30 TABLET | Refills: 0 | Status: SHIPPED | OUTPATIENT
Start: 2024-08-08

## 2024-08-08 NOTE — TELEPHONE ENCOUNTER
Patient left message on refill line that this was to be sent in at his appointment with Dr. Whiting last week. Forwarding refill request to on call provider.

## 2024-08-23 ENCOUNTER — HOSPITAL ENCOUNTER (OUTPATIENT)
Dept: OPERATING ROOM | Facility: HOSPITAL | Age: 69
Setting detail: OUTPATIENT SURGERY
Discharge: HOME | End: 2024-08-23
Payer: MEDICARE

## 2024-08-23 ENCOUNTER — ANESTHESIA EVENT (OUTPATIENT)
Dept: OPERATING ROOM | Facility: HOSPITAL | Age: 69
End: 2024-08-23
Payer: MEDICARE

## 2024-08-23 ENCOUNTER — ANESTHESIA (OUTPATIENT)
Dept: OPERATING ROOM | Facility: HOSPITAL | Age: 69
End: 2024-08-23
Payer: MEDICARE

## 2024-08-23 VITALS
SYSTOLIC BLOOD PRESSURE: 125 MMHG | HEART RATE: 60 BPM | TEMPERATURE: 97.2 F | RESPIRATION RATE: 16 BRPM | BODY MASS INDEX: 28.91 KG/M2 | OXYGEN SATURATION: 97 % | DIASTOLIC BLOOD PRESSURE: 78 MMHG | HEIGHT: 72 IN | WEIGHT: 213.41 LBS

## 2024-08-23 DIAGNOSIS — Z12.11 SCREENING FOR MALIGNANT NEOPLASM OF COLON: Primary | ICD-10-CM

## 2024-08-23 PROCEDURE — 45380 COLONOSCOPY AND BIOPSY: CPT | Performed by: SURGERY

## 2024-08-23 PROCEDURE — 3700000002 HC GENERAL ANESTHESIA TIME - EACH INCREMENTAL 1 MINUTE: Performed by: ANESTHESIOLOGY

## 2024-08-23 PROCEDURE — 3600000002 HC OR TIME - INITIAL BASE CHARGE - PROCEDURE LEVEL TWO: Performed by: ANESTHESIOLOGY

## 2024-08-23 PROCEDURE — 2500000004 HC RX 250 GENERAL PHARMACY W/ HCPCS (ALT 636 FOR OP/ED): Performed by: ANESTHESIOLOGY

## 2024-08-23 PROCEDURE — 7100000010 HC PHASE TWO TIME - EACH INCREMENTAL 1 MINUTE: Performed by: ANESTHESIOLOGY

## 2024-08-23 PROCEDURE — 7100000009 HC PHASE TWO TIME - INITIAL BASE CHARGE: Performed by: ANESTHESIOLOGY

## 2024-08-23 PROCEDURE — 2500000004 HC RX 250 GENERAL PHARMACY W/ HCPCS (ALT 636 FOR OP/ED): Performed by: SURGERY

## 2024-08-23 PROCEDURE — 3700000001 HC GENERAL ANESTHESIA TIME - INITIAL BASE CHARGE: Performed by: ANESTHESIOLOGY

## 2024-08-23 PROCEDURE — 3600000007 HC OR TIME - EACH INCREMENTAL 1 MINUTE - PROCEDURE LEVEL TWO: Performed by: ANESTHESIOLOGY

## 2024-08-23 RX ORDER — SODIUM CHLORIDE, SODIUM LACTATE, POTASSIUM CHLORIDE, CALCIUM CHLORIDE 600; 310; 30; 20 MG/100ML; MG/100ML; MG/100ML; MG/100ML
100 INJECTION, SOLUTION INTRAVENOUS CONTINUOUS
OUTPATIENT
Start: 2024-08-23

## 2024-08-23 RX ORDER — ONDANSETRON HYDROCHLORIDE 2 MG/ML
4 INJECTION, SOLUTION INTRAVENOUS ONCE AS NEEDED
Status: DISCONTINUED | OUTPATIENT
Start: 2024-08-23 | End: 2024-08-24 | Stop reason: HOSPADM

## 2024-08-23 RX ORDER — SODIUM CHLORIDE, SODIUM LACTATE, POTASSIUM CHLORIDE, CALCIUM CHLORIDE 600; 310; 30; 20 MG/100ML; MG/100ML; MG/100ML; MG/100ML
20 INJECTION, SOLUTION INTRAVENOUS CONTINUOUS
Status: DISCONTINUED | OUTPATIENT
Start: 2024-08-23 | End: 2024-08-24 | Stop reason: HOSPADM

## 2024-08-23 RX ORDER — PROPOFOL 10 MG/ML
INJECTION, EMULSION INTRAVENOUS AS NEEDED
Status: DISCONTINUED | OUTPATIENT
Start: 2024-08-23 | End: 2024-08-23

## 2024-08-23 RX ORDER — SODIUM CHLORIDE, SODIUM LACTATE, POTASSIUM CHLORIDE, CALCIUM CHLORIDE 600; 310; 30; 20 MG/100ML; MG/100ML; MG/100ML; MG/100ML
100 INJECTION, SOLUTION INTRAVENOUS CONTINUOUS
Status: DISCONTINUED | OUTPATIENT
Start: 2024-08-23 | End: 2024-08-24 | Stop reason: HOSPADM

## 2024-08-23 RX ORDER — FENTANYL CITRATE 50 UG/ML
INJECTION, SOLUTION INTRAMUSCULAR; INTRAVENOUS AS NEEDED
Status: DISCONTINUED | OUTPATIENT
Start: 2024-08-23 | End: 2024-08-23

## 2024-08-23 RX ORDER — ACETAMINOPHEN 325 MG/1
975 TABLET ORAL ONCE
Status: CANCELLED | OUTPATIENT
Start: 2024-08-23 | End: 2024-08-23

## 2024-08-23 RX ORDER — MIDAZOLAM HYDROCHLORIDE 1 MG/ML
1 INJECTION INTRAMUSCULAR; INTRAVENOUS ONCE
Status: CANCELLED | OUTPATIENT
Start: 2024-08-23 | End: 2024-08-23

## 2024-08-23 RX ORDER — MIDAZOLAM HYDROCHLORIDE 1 MG/ML
INJECTION INTRAMUSCULAR; INTRAVENOUS AS NEEDED
Status: DISCONTINUED | OUTPATIENT
Start: 2024-08-23 | End: 2024-08-23

## 2024-08-23 ASSESSMENT — PAIN - FUNCTIONAL ASSESSMENT
PAIN_FUNCTIONAL_ASSESSMENT: 0-10

## 2024-08-23 ASSESSMENT — COLUMBIA-SUICIDE SEVERITY RATING SCALE - C-SSRS
6. HAVE YOU EVER DONE ANYTHING, STARTED TO DO ANYTHING, OR PREPARED TO DO ANYTHING TO END YOUR LIFE?: NO
2. HAVE YOU ACTUALLY HAD ANY THOUGHTS OF KILLING YOURSELF?: NO
1. IN THE PAST MONTH, HAVE YOU WISHED YOU WERE DEAD OR WISHED YOU COULD GO TO SLEEP AND NOT WAKE UP?: NO

## 2024-08-23 ASSESSMENT — PAIN SCALES - GENERAL
PAINLEVEL_OUTOF10: 0 - NO PAIN
PAINLEVEL_OUTOF10: 0 - NO PAIN
PAIN_LEVEL: 2
PAINLEVEL_OUTOF10: 0 - NO PAIN
PAINLEVEL_OUTOF10: 0 - NO PAIN

## 2024-08-23 NOTE — ANESTHESIA PREPROCEDURE EVALUATION
Patient: Josemanuel Gorman    Procedure Information       Date/Time: 08/23/24 0700    Scheduled providers: Kathe Avalos MD; Scott Ramsey; Octavia Ivan RN    Procedure: COLONOSCOPY    Location: [facility] OR            Relevant Problems   Anesthesia   (+) Difficult intravenous access      Cardiac   (+) Benign essential hypertension      GI   (+) Gastroesophageal reflux disease       Clinical information reviewed:    Allergies  Meds   Med Hx  Surg Hx    Soc Hx        NPO Detail:  NPO/Void Status  Carbohydrate Drink Given Prior to Surgery? : N  Date of Last Liquid: 08/22/24  Time of Last Liquid: 2330  Date of Last Solid: 08/21/24  Time of Last Solid: 2330  Last Intake Type: Clear fluids  Time of Last Void: 0600         Physical Exam    Airway  Mallampati: II  TM distance: >3 FB  Neck ROM: full     Cardiovascular   Rhythm: regular  Rate: normal     Dental    Pulmonary    Abdominal          Anesthesia Plan    History of general anesthesia?: yes  History of complications of general anesthesia?: no    ASA 2     MAC     Anesthetic plan and risks discussed with patient.

## 2024-08-23 NOTE — ANESTHESIA POSTPROCEDURE EVALUATION
Patient: Josemanuel Gorman    Procedure Summary       Date: 08/23/24 Room / Location: [facility] OR    Anesthesia Start: 0700 Anesthesia Stop: 0741    Procedure: COLONOSCOPY Diagnosis:       Screening for malignant neoplasm of colon      Screening for malignant neoplasm of colon    Scheduled Providers: Kathe Avalos MD; Scott Ramsey; Octavia Ivan RN Responsible Provider: Joseamnuel Caputo MD    Anesthesia Type: MAC ASA Status: 2            Anesthesia Type: MAC    Vitals Value Taken Time   BP  08/23/24 0741   Temp  08/23/24 0741   Pulse 80 08/23/24 0741   Resp 12 08/23/24 0741   SpO2 99 08/23/24 0741       Anesthesia Post Evaluation    Patient location during evaluation: PACU  Patient participation: complete - patient participated  Level of consciousness: awake and alert  Pain score: 2  Pain management: adequate  Airway patency: patent  Cardiovascular status: acceptable  Respiratory status: acceptable  Hydration status: acceptable  Postoperative Nausea and Vomiting: none        No notable events documented.

## 2024-08-23 NOTE — H&P
General Surgery H&P    Patient: Josemanuel Gorman  : 1955  MRN: [id number]  Date: 24    Referring Primary Care Provider: Dima Whiting MD    Chief Complaint: Colon cancer screening    History of Present Illness: Josemanuel Gorman is a 68 y.o. old male seen at the request of Dr. Whiting for screening colonoscopy.  His last colonoscopy was 2019.  He had diverticulosis, hemorrhoids, and 4 subcentimeter polyps were removed.  There was also a possible faint tattoo noted in the ascending colon.  On colonoscopy in , he had a 3 cm sessile polyp in the ascending colon that was tattooed as it was initially partially resected, followed by a short interval repeat colonoscopy to assure complete resection. He has no family history of colon or rectal cancer or inflammatory bowel disease.  He has a sister with irritable bowel syndrome.  He has bowel movements twice daily.  He denies straining with bowel movements.  He does not take any stool softeners, fiber supplements, laxatives, or probiotics.  He denies seeing blood in the stool or dark black bowel movements.  He is not on any blood thinners or antiplatelet agents.  He denies any abdominal pain.    Medical History:  GERD  Hypertension  Hearing loss    Surgical History:  Colonoscopy with polypectomy, 2019 by Dr. Garcia, Diverticulosis, Hemorrhoids, 4 sub-centimeter polyps removed, possible faint tattoo in ascending colon  Excision of lipoma    Home Medications:  Prior to Admission medications    Medication Sig Start Date End Date Taking? Authorizing Provider   lisinopriL-hydrochlorothiazide 20-12.5 mg tablet TAKE 1 TABLET BY MOUTH DAILY 24  Yes Dima Whiting MD   tadalafil 20 mg tablet TAKE ONE TABLET BY MOUTH DAILY 1 HOUR BEFORE NEEDED.  GoodRx please 24  Yes Marielena Cobian DO     Allergies:  No Known Allergies    Family History:   No family history of colon or rectal cancer or inflammatory bowel disease.  Sister with IBS.    Social  "History:  Non-smoker.  Drinks on average 2 cans of beer per week.  No drug use.    ROS:  Constitutional:  no fever, sweats, and chills  Cardiovascular: No chest pain  Respiratory: No cough or shortness of breath  Gastrointestinal: No abdominal pain.  No change in bowel habits.  No blood in the stool.  No constipation or diarrhea.  Genitourinary: no dysuria  Musculoskeletal: no weakness or swelling  Integumentary: no rashes  Neurological: no confusion  Endocrine: no heat or cold intolerance  Heme/Lymph: no easy bruising or bleeding    Objective:  /79   Pulse 68   Temp 35.9 °C (96.7 °F) (Temporal)   Resp 20   Ht 1.82 m (5' 11.65\")   Wt 96.8 kg (213 lb 6.5 oz)   SpO2 100%   BMI 29.22 kg/m²     Physical Exam:  Constitutional: No acute distress, conversant, pleasant  Neurologic: alert and oriented  Psych: appropriate affect  Ears, Nose, Mouth and Throat: mucus membranes moist  Pulmonary: No labored breathing  Cardiovascular: Regular rate and rhythm  Abdomen: soft, non-distended, non-tender, BMI 29  Musculoskeletal: Moves all extremities, no edema  Skin: no jaundice    Labs:  Labs from 7/30/2024 reviewed: Hgb 14.6    Imaging:  No pertinent imaging available for review.    Assessment and Plan: Josemanuel Gorman is a 68 y.o. old male with a history of colon polyps, due for surveillance colonoscopy.  We discussed the risks of this procedure.  This included risks of bleeding, perforation, missed polyps, incomplete colonoscopy, and potential need for additional procedures pending findings.  He was agreeable to proceed.     Kathe Avalos MD  8/23/2024    "

## 2024-08-23 NOTE — DISCHARGE INSTRUCTIONS
Discharge instructions reviewed with patient and family, both denies questions. Patient vitals within normal limits.

## 2024-08-26 ENCOUNTER — TELEPHONE (OUTPATIENT)
Dept: SURGERY | Facility: CLINIC | Age: 69
End: 2024-08-26
Payer: MEDICARE

## 2024-08-26 NOTE — TELEPHONE ENCOUNTER
Left message to see how patient was doing after colonoscopy. Pt needs repeat colon in 3-5 years.   will call with Pathology in 2 weeks.

## 2024-09-05 LAB
LABORATORY COMMENT REPORT: NORMAL
PATH REPORT.FINAL DX SPEC: NORMAL
PATH REPORT.GROSS SPEC: NORMAL
PATH REPORT.TOTAL CANCER: NORMAL

## 2024-09-06 ENCOUNTER — TELEPHONE (OUTPATIENT)
Dept: SURGERY | Facility: HOSPITAL | Age: 69
End: 2024-09-06
Payer: MEDICARE

## 2024-09-06 NOTE — TELEPHONE ENCOUNTER
I called the patient to discuss pathology from recent colonoscopy. There was no answer, but voicemail was left with callback number provided.  2 of what appeared to be polyps had no abnormalities, just some lymphoid aggregate.  The other 2 polyps were hyperplastic.  While these are all very benign findings, I still recommend a 5-year surveillance colonoscopy given that he has had polyps on all previous scopes as well.    Kathe Avalos MD  09/06/24  8:51 AM

## 2025-01-02 ENCOUNTER — LAB (OUTPATIENT)
Dept: LAB | Facility: LAB | Age: 70
End: 2025-01-02
Payer: MEDICARE

## 2025-01-02 DIAGNOSIS — R97.20 ELEVATED PSA: ICD-10-CM

## 2025-01-02 PROCEDURE — 84153 ASSAY OF PSA TOTAL: CPT

## 2025-01-03 LAB — PSA SERPL-MCNC: 6.05 NG/ML

## 2025-01-06 ENCOUNTER — APPOINTMENT (OUTPATIENT)
Dept: UROLOGY | Facility: HOSPITAL | Age: 70
End: 2025-01-06
Payer: MEDICARE

## 2025-01-14 DIAGNOSIS — N52.9 ERECTILE DYSFUNCTION, UNSPECIFIED ERECTILE DYSFUNCTION TYPE: ICD-10-CM

## 2025-01-14 RX ORDER — TADALAFIL 20 MG/1
TABLET ORAL
Qty: 30 TABLET | Refills: 0 | Status: SHIPPED | OUTPATIENT
Start: 2025-01-14

## 2025-01-28 ENCOUNTER — HOSPITAL ENCOUNTER (OUTPATIENT)
Dept: RADIOLOGY | Facility: CLINIC | Age: 70
Discharge: HOME | End: 2025-01-28
Payer: MEDICARE

## 2025-01-28 DIAGNOSIS — R97.20 ELEVATED PSA: ICD-10-CM

## 2025-01-28 PROCEDURE — 72197 MRI PELVIS W/O & W/DYE: CPT

## 2025-01-28 PROCEDURE — 72197 MRI PELVIS W/O & W/DYE: CPT | Performed by: RADIOLOGY

## 2025-01-28 PROCEDURE — 2550000001 HC RX 255 CONTRASTS: Performed by: STUDENT IN AN ORGANIZED HEALTH CARE EDUCATION/TRAINING PROGRAM

## 2025-01-28 PROCEDURE — A9575 INJ GADOTERATE MEGLUMI 0.1ML: HCPCS | Performed by: STUDENT IN AN ORGANIZED HEALTH CARE EDUCATION/TRAINING PROGRAM

## 2025-01-28 RX ORDER — GADOTERATE MEGLUMINE 376.9 MG/ML
0.2 INJECTION INTRAVENOUS
Status: COMPLETED | OUTPATIENT
Start: 2025-01-28 | End: 2025-01-28

## 2025-01-28 RX ADMIN — GADOTERATE MEGLUMINE 20 ML: 376.9 INJECTION INTRAVENOUS at 13:57

## 2025-01-29 DIAGNOSIS — I10 BENIGN ESSENTIAL HYPERTENSION: ICD-10-CM

## 2025-01-30 RX ORDER — LISINOPRIL AND HYDROCHLOROTHIAZIDE 12.5; 2 MG/1; MG/1
TABLET ORAL
Qty: 100 TABLET | Refills: 2 | Status: SHIPPED | OUTPATIENT
Start: 2025-01-30

## 2025-02-03 ENCOUNTER — OFFICE VISIT (OUTPATIENT)
Dept: UROLOGY | Facility: HOSPITAL | Age: 70
End: 2025-02-03
Payer: MEDICARE

## 2025-02-03 DIAGNOSIS — R97.20 ELEVATED PSA: Primary | ICD-10-CM

## 2025-02-03 PROCEDURE — 99213 OFFICE O/P EST LOW 20 MIN: CPT | Performed by: STUDENT IN AN ORGANIZED HEALTH CARE EDUCATION/TRAINING PROGRAM

## 2025-02-03 PROCEDURE — 1123F ACP DISCUSS/DSCN MKR DOCD: CPT | Performed by: STUDENT IN AN ORGANIZED HEALTH CARE EDUCATION/TRAINING PROGRAM

## 2025-02-03 PROCEDURE — G2211 COMPLEX E/M VISIT ADD ON: HCPCS | Performed by: STUDENT IN AN ORGANIZED HEALTH CARE EDUCATION/TRAINING PROGRAM

## 2025-02-03 NOTE — PROGRESS NOTES
UROLOGIC FOLLOW-UP VISIT     PROBLEM LIST:  1. Elevated PSA               HISTORY OF PRESENT ILLNESS:   67-year-old male with a history of elevated PSA who subsequently underwent a prostate MRI which showed a PI-RADS 5 lesion. Patient previously underwent a prostate MRI in 2021 which was negative for any clinically significant lesions. PSA 10/30/2023 7.14, 1/29/2024 6.42, 5/15/24 8.30. Patient underwent fusion biopsy on 3/22/23, which was negative. He underwent repeat MR guided prostate biopsy on 6/7/24.     2/3/25: Today, he reports being overall well. He underwent a repeat MRI prostate on 1/28/25 and presents today to discuss results. No acute compliant or concerns. Stables urinary symptoms. PSA on 1/2/25 was 6.05. He is cutting back on red meat and is exercising on regular basis at home. However, his wife states he eats a lot of sugar things. Denies any hematuria, f/c/n/v.      PAST MEDICAL HISTORY:  Past Medical History:   Diagnosis Date    Encounter for general adult medical examination without abnormal findings 06/24/2021    Encounter for Medicare annual wellness exam    Encounter for general adult medical examination without abnormal findings 04/10/2019    Welcome to Medicare preventive visit    Encounter for immunization     Immunization due    Encounter for screening for malignant neoplasm of colon 04/03/2018    Colon cancer screening    GERD (gastroesophageal reflux disease) 6/2018    HL (hearing loss) 6/2008    Hypertension 6/2006    Personal history of other drug therapy 04/10/2019    History of pneumococcal vaccination    Personal history of other drug therapy 06/24/2021    History of drug therapy       PAST SURGICAL HISTORY:  Past Surgical History:   Procedure Laterality Date    COLONOSCOPY W/ BIOPSIES  08/23/2024    repeat 3-5years        COLONOSCOPY W/ POLYPECTOMY  09/22/2014    Complete Colonoscopy For Polyp Removal    OTHER SURGICAL HISTORY  03/26/2014    Lipectomy Of Arm         ALLERGIES:   No Known Allergies     MEDICATIONS:     Current Outpatient Medications:     lisinopriL-hydrochlorothiazide 20-12.5 mg tablet, TAKE 1 TABLET BY MOUTH DAILY, Disp: 100 tablet, Rfl: 2    tadalafil 20 mg tablet, TAKE ONE TABLET BY MOUTH DAILY 1 HOUR BEFORE NEEDED.  GoodRx please, Disp: 30 tablet, Rfl: 0      SOCIAL HISTORY:  Patient  reports that he has never smoked. He has never been exposed to tobacco smoke. He has never used smokeless tobacco. He reports that he does not currently use alcohol after a past usage of about 2.0 standard drinks of alcohol per week. He reports that he does not use drugs.   Social History     Socioeconomic History    Marital status: Single     Spouse name: Not on file    Number of children: Not on file    Years of education: Not on file    Highest education level: Not on file   Occupational History    Not on file   Tobacco Use    Smoking status: Never     Passive exposure: Never    Smokeless tobacco: Never   Vaping Use    Vaping status: Never Used   Substance and Sexual Activity    Alcohol use: Not Currently     Alcohol/week: 2.0 standard drinks of alcohol     Types: 2 Cans of beer per week    Drug use: Never    Sexual activity: Yes     Partners: Female     Birth control/protection: None   Other Topics Concern    Not on file   Social History Narrative    Not on file     Social Drivers of Health     Financial Resource Strain: Not on file   Food Insecurity: Not on file   Transportation Needs: Not on file   Physical Activity: Not on file   Stress: Not on file   Social Connections: Not on file   Intimate Partner Violence: Not on file   Housing Stability: Not on file       FAMILY HISTORY:  Family History   Problem Relation Name Age of Onset    Hypertension Father JR     Multiple sclerosis Sister      Hypertension Brother Rick        REVIEW OF SYSTEMS:   Constitutional: Negative for fever and chills. Denies anorexia, weight loss.  Eyes: Negative for visual disturbance.    Respiratory: Negative for shortness of breath.    Cardiovascular: Negative for chest pain.   Gastrointestinal: Negative for nausea and vomiting.   Genitourinary: See interval history above.  Skin: Negative for rash.   Neurological: Negative for dizziness and numbness.   Psychiatric/Behavioral: Negative for confusion and decreased concentration.     PHYSICAL EXAM:  There were no vitals taken for this visit.  Constitutional: Patient appears well-developed and well-nourished. No distress.    Head: Normocephalic and atraumatic.    Neck: Normal range of motion.    Cardiovascular: Normal rate.    Pulmonary/Chest: Effort normal. No respiratory distress.   Abdominal: soft NTND  Musculoskeletal: Normal range of motion.    Neurological: Alert and oriented to person, place, and time.  Psychiatric: Normal mood and affect. Behavior is normal. Thought content normal.      LABORATORY REVIEW:     Lab Results   Component Value Date    BUN 21 07/30/2024    CREATININE 0.69 07/30/2024    EGFR >90 07/30/2024     07/30/2024    K 4.3 07/30/2024     07/30/2024    CO2 27 07/30/2024    CALCIUM 9.7 07/30/2024      Lab Results   Component Value Date    WBC 5.5 07/30/2024    RBC 4.58 07/30/2024    HGB 14.6 07/30/2024    HCT 43.9 07/30/2024    MCV 96 07/30/2024    MCH 31.9 07/30/2024    MCHC 33.3 07/30/2024    RDW 13.2 07/30/2024     07/30/2024        Lab Results   Component Value Date    PSA 6.05 (H) 01/02/2025    PSA 8.22 (H) 07/30/2024    PSA 8.30 (H) 05/15/2024    PSA 6.42 (H) 01/29/2024    PSA 7.14 (H) 10/30/2023    PSA 6.52 (H) 07/18/2023    PSA 7.12 (H) 03/01/2023    PSA 7.87 (H) 01/05/2023    PSA 8.0 (H) 10/03/2022    PSA 7.6 (H) 06/21/2022    PSA 8.5 (H) 02/15/2022    PSA 8.8 (H) 07/02/2021         Assessment:      1. Elevated PSA               Plan:   Reviewed and interpreted patient's PSA trend  Reviewed and interpreted patient's MRI results   Advised to eat well balanced diet and remain active  RTC in 6mo with  prior PSA.    28 minutes total spent on patient's care today; >50% time spent on counseling/coordination of care    Scribe Attestation  By signing my name below, Lisy CHAVEZ Scribe   attest that this documentation has been prepared under the direction and in the presence of Jerald Pineda MD.

## 2025-05-08 DIAGNOSIS — N52.9 ERECTILE DYSFUNCTION, UNSPECIFIED ERECTILE DYSFUNCTION TYPE: ICD-10-CM

## 2025-05-09 DIAGNOSIS — N52.9 ERECTILE DYSFUNCTION, UNSPECIFIED ERECTILE DYSFUNCTION TYPE: ICD-10-CM

## 2025-05-09 RX ORDER — TADALAFIL 20 MG/1
TABLET ORAL
Qty: 30 TABLET | Refills: 3 | Status: SHIPPED | OUTPATIENT
Start: 2025-05-09

## 2025-05-13 RX ORDER — TADALAFIL 20 MG/1
TABLET ORAL
Qty: 30 TABLET | Refills: 3 | Status: SHIPPED | OUTPATIENT
Start: 2025-05-13

## 2025-07-31 ENCOUNTER — APPOINTMENT (OUTPATIENT)
Dept: PRIMARY CARE | Facility: CLINIC | Age: 70
End: 2025-07-31
Payer: MEDICARE

## 2025-07-31 LAB — PSA SERPL-MCNC: 6.61 NG/ML

## 2025-08-03 DIAGNOSIS — R97.20 ELEVATED PSA: ICD-10-CM

## 2025-08-04 ENCOUNTER — APPOINTMENT (OUTPATIENT)
Dept: UROLOGY | Facility: HOSPITAL | Age: 70
End: 2025-08-04
Payer: MEDICARE

## 2025-08-04 DIAGNOSIS — R39.9 LOWER URINARY TRACT SYMPTOMS (LUTS): ICD-10-CM

## 2025-08-04 DIAGNOSIS — R97.20 ELEVATED PSA: ICD-10-CM

## 2025-08-04 PROCEDURE — 1159F MED LIST DOCD IN RCRD: CPT | Performed by: STUDENT IN AN ORGANIZED HEALTH CARE EDUCATION/TRAINING PROGRAM

## 2025-08-04 PROCEDURE — 99213 OFFICE O/P EST LOW 20 MIN: CPT | Performed by: STUDENT IN AN ORGANIZED HEALTH CARE EDUCATION/TRAINING PROGRAM

## 2025-08-04 PROCEDURE — G2211 COMPLEX E/M VISIT ADD ON: HCPCS | Performed by: STUDENT IN AN ORGANIZED HEALTH CARE EDUCATION/TRAINING PROGRAM

## 2025-08-04 NOTE — PROGRESS NOTES
UROLOGIC FOLLOW-UP VISIT     PROBLEM LIST:  1. Lower urinary tract symptoms (LUTS)  Measure post void residual      2. Elevated PSA  Prostate Specific Antigen, Screen    Prostate Specific Antigen, Screen        HISTORY OF PRESENT ILLNESS:   67-year-old male with a history of elevated PSA who subsequently underwent a prostate MRI which showed a PI-RADS 5 lesion. Patient previously underwent a prostate MRI in 2021 which was negative for any clinically significant lesions. PSA 10/30/2023 7.14, 1/29/2024 6.42, 5/15/24 8.30. Patient underwent fusion biopsy on 3/22/23, which was negative. He underwent repeat MR guided prostate biopsy on 6/7/24.     2/3/25: Today, he reports being overall well. He underwent a repeat MRI prostate on 1/28/25 and presents today to discuss results. No acute compliant or concerns. Stables urinary symptoms. PSA on 1/2/25 was 6.05. He is cutting back on red meat and is exercising on regular basis at home. However, his wife states he eats a lot of sugar things. Denies any hematuria, f/c/n/v.      8/4/25: Today, the patient reports doing well since last visit. No change in symptoms. PSA on 7/30/25 was 6.61. The patient notes that he has cut out red meat. His wife states that he continues eating a lot of processed foods. The patient exercises 3-4x a week.  Denies any f/c/n/v.     PAST MEDICAL HISTORY:  Past Medical History:   Diagnosis Date    Elevated PSA     Encounter for general adult medical examination without abnormal findings 06/24/2021    Encounter for Medicare annual wellness exam    Encounter for general adult medical examination without abnormal findings 04/10/2019    Welcome to Medicare preventive visit    Encounter for immunization     Immunization due    Encounter for screening for malignant neoplasm of colon 04/03/2018    Colon cancer screening    GERD (gastroesophageal reflux disease) 6/2018    HL (hearing loss) 6/2008    Hypertension 6/2006    Personal history of other drug  therapy 04/10/2019    History of pneumococcal vaccination    Personal history of other drug therapy 06/24/2021    History of drug therapy       PAST SURGICAL HISTORY:  Past Surgical History:   Procedure Laterality Date    COLONOSCOPY W/ BIOPSIES  08/23/2024    repeat 3-5years        COLONOSCOPY W/ POLYPECTOMY  09/22/2014    Complete Colonoscopy For Polyp Removal    OTHER SURGICAL HISTORY  03/26/2014    Lipectomy Of Arm        ALLERGIES:   No Known Allergies     MEDICATIONS:     Current Outpatient Medications:     lisinopriL-hydrochlorothiazide 20-12.5 mg tablet, TAKE 1 TABLET BY MOUTH DAILY, Disp: 100 tablet, Rfl: 2    tadalafil 20 mg tablet, TAKE ONE TABLET BY MOUTH DAILY 1 HOUR BEFORE NEEDED.  GoodRx please, Disp: 30 tablet, Rfl: 3      SOCIAL HISTORY:  Patient  reports that he has never smoked. He has never been exposed to tobacco smoke. He has never used smokeless tobacco. He reports current alcohol use of about 2.0 standard drinks of alcohol per week. He reports that he does not use drugs.   Social History     Socioeconomic History    Marital status: Single     Spouse name: Not on file    Number of children: Not on file    Years of education: Not on file    Highest education level: Not on file   Occupational History    Not on file   Tobacco Use    Smoking status: Never     Passive exposure: Never    Smokeless tobacco: Never   Vaping Use    Vaping status: Never Used   Substance and Sexual Activity    Alcohol use: Yes     Alcohol/week: 2.0 standard drinks of alcohol     Types: 2 Cans of beer per week    Drug use: Never    Sexual activity: Yes     Partners: Female     Birth control/protection: None   Other Topics Concern    Not on file   Social History Narrative    Not on file     Social Drivers of Health     Financial Resource Strain: Not on file   Food Insecurity: Not on file   Transportation Needs: Not on file   Physical Activity: Not on file   Stress: Not on file   Social Connections: Not on file    Intimate Partner Violence: Not on file   Housing Stability: Not on file       FAMILY HISTORY:  Family History   Problem Relation Name Age of Onset    Hypertension Father JR     Multiple sclerosis Sister      Hypertension Brother Rick        REVIEW OF SYSTEMS:   Constitutional: Negative for fever and chills. Denies anorexia, weight loss.  Eyes: Negative for visual disturbance.   Respiratory: Negative for shortness of breath.    Cardiovascular: Negative for chest pain.   Gastrointestinal: Negative for nausea and vomiting.   Genitourinary: See interval history above.  Skin: Negative for rash.   Neurological: Negative for dizziness and numbness.   Psychiatric/Behavioral: Negative for confusion and decreased concentration.     PHYSICAL EXAM:  There were no vitals taken for this visit.  Constitutional: Patient appears well-developed and well-nourished. No distress.    Head: Normocephalic and atraumatic.    Neck: Normal range of motion.    Cardiovascular: Normal rate.    Pulmonary/Chest: Effort normal. No respiratory distress.   Abdominal: soft NTND  Musculoskeletal: Normal range of motion.    Neurological: Alert and oriented to person, place, and time.  Psychiatric: Normal mood and affect. Behavior is normal. Thought content normal.      LABORATORY REVIEW:     Lab Results   Component Value Date    BUN 21 07/30/2024    CREATININE 0.69 07/30/2024    EGFR >90 07/30/2024     07/30/2024    K 4.3 07/30/2024     07/30/2024    CO2 27 07/30/2024    CALCIUM 9.7 07/30/2024      Lab Results   Component Value Date    WBC 5.5 07/30/2024    RBC 4.58 07/30/2024    HGB 14.6 07/30/2024    HCT 43.9 07/30/2024    MCV 96 07/30/2024    MCH 31.9 07/30/2024    MCHC 33.3 07/30/2024    RDW 13.2 07/30/2024     07/30/2024        Lab Results   Component Value Date    PSA 6.61 (H) 07/30/2025    PSA 6.05 (H) 01/02/2025    PSA 8.22 (H) 07/30/2024    PSA 8.30 (H) 05/15/2024    PSA 6.42 (H) 01/29/2024    PSA 7.14 (H) 10/30/2023     PSA 6.52 (H) 07/18/2023    PSA 7.12 (H) 03/01/2023    PSA 7.87 (H) 01/05/2023    PSA 8.0 (H) 10/03/2022    PSA 7.6 (H) 06/21/2022    PSA 8.5 (H) 02/15/2022    PSA 8.8 (H) 07/02/2021         Assessment:     1. Lower urinary tract symptoms (LUTS)  Measure post void residual      2. Elevated PSA  Prostate Specific Antigen, Screen    Prostate Specific Antigen, Screen         Plan:   Reviewed and interpreted patient's PSA trend  Advised to continue eating a well balanced diet and remain active  RTC in 6mo with prior PSA.    25 minutes total spent on patient's care today; >50% time spent on counseling/coordination of care    Scribe Attestation:  By signing my name below, I, Howard Payne attest that this documentation has been prepared under the direction and in the presence of Jerald Pineda MD.    Provider Attestation - Scribe documentation:  All medical record entries made by Maddie Nguyen were at my direction and personally dictated by me, Jerald Pineda MD. I have reviewed the chart and agree that the record is accurate and I confirmed that it reflects my personal performance of the history, physical exam, discussion, and plan.

## 2025-08-05 ENCOUNTER — APPOINTMENT (OUTPATIENT)
Dept: PRIMARY CARE | Facility: CLINIC | Age: 70
End: 2025-08-05
Payer: MEDICARE

## 2025-08-05 VITALS
HEIGHT: 72 IN | OXYGEN SATURATION: 99 % | SYSTOLIC BLOOD PRESSURE: 132 MMHG | BODY MASS INDEX: 29.61 KG/M2 | WEIGHT: 218.6 LBS | HEART RATE: 63 BPM | DIASTOLIC BLOOD PRESSURE: 76 MMHG | RESPIRATION RATE: 18 BRPM

## 2025-08-05 DIAGNOSIS — D12.6 TUBULAR ADENOMA OF COLON: ICD-10-CM

## 2025-08-05 DIAGNOSIS — N52.9 ERECTILE DYSFUNCTION, UNSPECIFIED ERECTILE DYSFUNCTION TYPE: ICD-10-CM

## 2025-08-05 DIAGNOSIS — R06.83 SNORING: ICD-10-CM

## 2025-08-05 DIAGNOSIS — E78.00 ELEVATED LDL CHOLESTEROL LEVEL: ICD-10-CM

## 2025-08-05 DIAGNOSIS — Z11.59 NEED FOR HEPATITIS C SCREENING TEST: ICD-10-CM

## 2025-08-05 DIAGNOSIS — I10 BENIGN ESSENTIAL HYPERTENSION: Primary | ICD-10-CM

## 2025-08-05 DIAGNOSIS — R97.20 ELEVATED PSA: ICD-10-CM

## 2025-08-05 DIAGNOSIS — Z76.89 ENCOUNTER TO ESTABLISH CARE: ICD-10-CM

## 2025-08-05 DIAGNOSIS — E55.9 VITAMIN D INSUFFICIENCY: ICD-10-CM

## 2025-08-05 DIAGNOSIS — R73.01 ELEVATED FASTING GLUCOSE: ICD-10-CM

## 2025-08-05 DIAGNOSIS — K21.9 GASTROESOPHAGEAL REFLUX DISEASE, UNSPECIFIED WHETHER ESOPHAGITIS PRESENT: ICD-10-CM

## 2025-08-05 PROBLEM — R22.0 FACIAL SWELLING: Status: RESOLVED | Noted: 2023-07-18 | Resolved: 2025-08-05

## 2025-08-05 PROBLEM — K06.9 GINGIVAL AND PERIODONTAL DISEASE: Status: RESOLVED | Noted: 2023-07-18 | Resolved: 2025-08-05

## 2025-08-05 PROBLEM — K05.6 GINGIVAL AND PERIODONTAL DISEASE: Status: RESOLVED | Noted: 2023-07-18 | Resolved: 2025-08-05

## 2025-08-05 PROCEDURE — 3075F SYST BP GE 130 - 139MM HG: CPT | Performed by: STUDENT IN AN ORGANIZED HEALTH CARE EDUCATION/TRAINING PROGRAM

## 2025-08-05 PROCEDURE — 1160F RVW MEDS BY RX/DR IN RCRD: CPT | Performed by: STUDENT IN AN ORGANIZED HEALTH CARE EDUCATION/TRAINING PROGRAM

## 2025-08-05 PROCEDURE — 3078F DIAST BP <80 MM HG: CPT | Performed by: STUDENT IN AN ORGANIZED HEALTH CARE EDUCATION/TRAINING PROGRAM

## 2025-08-05 PROCEDURE — 99214 OFFICE O/P EST MOD 30 MIN: CPT | Performed by: STUDENT IN AN ORGANIZED HEALTH CARE EDUCATION/TRAINING PROGRAM

## 2025-08-05 PROCEDURE — G2211 COMPLEX E/M VISIT ADD ON: HCPCS | Performed by: STUDENT IN AN ORGANIZED HEALTH CARE EDUCATION/TRAINING PROGRAM

## 2025-08-05 PROCEDURE — 3008F BODY MASS INDEX DOCD: CPT | Performed by: STUDENT IN AN ORGANIZED HEALTH CARE EDUCATION/TRAINING PROGRAM

## 2025-08-05 PROCEDURE — 1159F MED LIST DOCD IN RCRD: CPT | Performed by: STUDENT IN AN ORGANIZED HEALTH CARE EDUCATION/TRAINING PROGRAM

## 2025-08-05 ASSESSMENT — ENCOUNTER SYMPTOMS
COUGH: 0
OCCASIONAL FEELINGS OF UNSTEADINESS: 0
DEPRESSION: 0
LOSS OF SENSATION IN FEET: 0
FATIGUE: 0
FEVER: 0
ABDOMINAL PAIN: 0
CHILLS: 0
SHORTNESS OF BREATH: 0

## 2025-08-05 ASSESSMENT — PATIENT HEALTH QUESTIONNAIRE - PHQ9
SUM OF ALL RESPONSES TO PHQ9 QUESTIONS 1 AND 2: 0
2. FEELING DOWN, DEPRESSED OR HOPELESS: NOT AT ALL
1. LITTLE INTEREST OR PLEASURE IN DOING THINGS: NOT AT ALL

## 2025-11-13 ENCOUNTER — APPOINTMENT (OUTPATIENT)
Dept: PRIMARY CARE | Facility: CLINIC | Age: 70
End: 2025-11-13
Payer: MEDICARE

## (undated) DEVICE — MARKER, SKIN, DUAL TIP INK W/9 LABEL AND REMOVABLE TIME OUT SLEEVE

## (undated) DEVICE — PROBE COVER, ULTRASOUND 8818

## (undated) DEVICE — ACCESS SYSTEM, PRECISIONPOINT, TRANSPERINEAL

## (undated) DEVICE — URONAV HITACHI PROBE HOLDER

## (undated) DEVICE — SYRINGE, THUMB RING W/FINER RING, 20CC, FIXED MALE LUER LOCK

## (undated) DEVICE — CONTAINER, SPECIMEN, 120 ML, STERILE

## (undated) DEVICE — DRESSING, NON-ADHERENT, TELFA, OUCHLESS, 3 X 8 IN, STERILE

## (undated) DEVICE — NEEDLE, SPINAL, QUINCKE, LUER LOCK, 18 G X 6 IN

## (undated) DEVICE — TOWELS 4-PK

## (undated) DEVICE — DRESSING, GAUZE, 16 PLY, 4 X 4 IN, STERILE